# Patient Record
Sex: FEMALE | Race: WHITE | Employment: PART TIME | URBAN - METROPOLITAN AREA
[De-identification: names, ages, dates, MRNs, and addresses within clinical notes are randomized per-mention and may not be internally consistent; named-entity substitution may affect disease eponyms.]

---

## 2019-01-25 ENCOUNTER — OFFICE VISIT (OUTPATIENT)
Dept: HEMATOLOGY | Age: 69
End: 2019-01-25

## 2019-01-25 VITALS
HEART RATE: 71 BPM | WEIGHT: 180 LBS | DIASTOLIC BLOOD PRESSURE: 71 MMHG | HEIGHT: 64 IN | SYSTOLIC BLOOD PRESSURE: 164 MMHG | BODY MASS INDEX: 30.73 KG/M2 | TEMPERATURE: 99.5 F | OXYGEN SATURATION: 99 %

## 2019-01-25 DIAGNOSIS — K75.81 NASH (NONALCOHOLIC STEATOHEPATITIS): Primary | ICD-10-CM

## 2019-01-25 PROBLEM — E11.9 DIABETES MELLITUS, TYPE 2 (HCC): Status: ACTIVE | Noted: 2019-01-25

## 2019-01-25 PROBLEM — Z90.49 HISTORY OF CHOLECYSTECTOMY: Status: ACTIVE | Noted: 2019-01-25

## 2019-01-25 PROBLEM — Z90.710 S/P TAH (TOTAL ABDOMINAL HYSTERECTOMY): Status: ACTIVE | Noted: 2019-01-25

## 2019-01-25 PROBLEM — N20.0 KIDNEY STONES: Status: ACTIVE | Noted: 2019-01-25

## 2019-01-25 PROBLEM — E03.9 HYPOTHYROIDISM: Status: ACTIVE | Noted: 2019-01-25

## 2019-01-25 PROBLEM — I10 HYPERTENSION: Status: ACTIVE | Noted: 2019-01-25

## 2019-01-25 PROBLEM — Z98.890 H/O CARPAL TUNNEL REPAIR: Status: ACTIVE | Noted: 2019-01-25

## 2019-01-25 RX ORDER — CEPHALEXIN 500 MG/1
500 CAPSULE ORAL 4 TIMES DAILY
COMMUNITY
End: 2022-10-13

## 2019-01-25 RX ORDER — BISMUTH SUBSALICYLATE 262 MG
1 TABLET,CHEWABLE ORAL DAILY
COMMUNITY
End: 2022-10-13

## 2019-01-25 NOTE — PROGRESS NOTES
[address]40 [facility], [place], [address], Cite [facility], [place]       Laura Molina, JESSICA Perez, [facility]   Sushma Byrnes, RANDALL Pa [facility] 136    at [address]    [address] [place] [address], [address] [place] [address], Denis  22.    [phone]    FAX: [address]    [address], [address], [address]    [phone]    FAX: [phone]       Patient Care Team:  Kareem Ng MD as PCP - General (Family Practice)      Problem List  Date Reviewed: 1/25/2019          Codes Class Noted    KELSEY (nonalcoholic steatohepatitis) ICD-10-CM: K75.81  ICD-9-CM: 571.8  1/25/2019        Diabetes mellitus, type 2 ([facility] ICD-10-CM: E11.9  ICD-9-CM: 250.00  1/25/2019        Hypertension ICD-10-CM: I10  ICD-9-CM: 401.9  1/25/2019        Hypothyroidism ICD-10-CM: E03.9  ICD-9-CM: 244.9  1/25/2019        H/O carpal tunnel repair ICD-10-CM: Z98.890  ICD-9-CM: V15.29  1/25/2019        S/P CARIDAD (total abdominal hysterectomy) ICD-10-CM: Z90.710  ICD-9-CM: V88.01  1/25/2019        History of cholecystectomy ICD-10-CM: Z90.49  ICD-9-CM: V45.79  1/25/2019        Kidney stones ICD-10-CM: N20.0  ICD-9-CM: 592.0  1/25/2019        H/O total knee replacement ICD-10-CM: Z96.659  ICD-9-CM: V43.65  8/26/2013                The clinicians listed above have asked me to see Faisal Munson in consultation regarding fatty liver disease and its management. All medical records sent by the referring physicians were reviewed including imaging studies and pathology. The patient is a 79 y.o.  female who was found to have KELSEY on liver biopsy in 10/2018. 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     The most recent imaging of the liver was MRI performed in 10/2018. Results suggest fatty liver disease. A liver biopsy was performed in 10/2018. This demonstrated KELSEY with stage 2-3 fibrosis. The patient has no symptoms which can be attributed to the liver disorder. The patient has not experienced fatigue, pain in the right side over the liver,     The patient completes all daily activities without any functional limitations. ASSESSMENT AND PLAN:  KELSEY  The diagnosis is based upon liver biopsy,   A liver biopsy performed in 10/2018 shows stage 2-3 fibrosis. If the patient looses 20% of current body weight, which is 36 pounds, down to a weight of of 140 pounds, all steatosis will have resolved. Liver transaminases are elevated. ALP is normal.  Liver function is normal.  The platelet count is normal.      Will perform additional serologic tests to screen for other causes of chronic liver disease. There is currently no FDA approved medical treatment for fatty liver, NALFD or KELSEY. The patient was counseled regarding diet and exercise to achieve weight loss. The best diet for patients with fatty liver is one very low in carbohydrates and enriched with protein such as an [employer] program.      The patient was told to consume any food products and drinks containing fructose as this enhances hepatic fat synthesis. There is no medication or vitamin supplements that we advocate for KELSEY. Using glitazones in patients without diabetes mellitus has been shown to reduce fat content in the liver but has no effect on fibrosis and is associated with weight gain. Vitamin E has also been used but the data is not very good and most experts no longer advocate this. The only medical treatments for KELSEY are though clinical trials. The patient would like to participate in a clinical trial.    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      This single mutation can cause an elevation in ferritin but it does not lead to iron overload in the liver or other organs. A liver biopsy does not demonstrate evidence of iron overload. Screening for Hepatocellular Carcinoma  HCC screening is not necessary if the patient has no evidence of cirrhosis. Treatment of other medical problems in patients with chronic liver disease  There are no contraindications for the patient to take most medications that are necessary for treatment of other medical issues. The patient can take the following medications as these will not impact the patient's current liver disease. Any medications utilized for treatment of DM  Statins to treat hypercholesterolemia    The patient does not comsume alcohol on a daily basis. Normal doses of acetaminophen, as recommended on the label of the bottle, are not hepatotoxic except in the setting of daily alcohol use, even in patients with cirrhosis and can be utilized for pain. Counseling for alcohol in patients with chronic liver disease  The patient was counseled regarding alcohol consumption and the effect of alcohol on chronic liver disease. The patient does not consume any significant amount of alcohol. Vaccinations   The need for vaccination against viral hepatitis A and B will be assessed with serologic and instituted as appropriate. Routine vaccinations against other bacterial and viral agents can be performed as indicated. Annual flu vaccination should be administered if indicated.       ALLERGIES  Allergies   Allergen Reactions    Augmentin [Amoxicillin-Pot Clavulanate] Other (comments)     Tachycardia    Codeine Other (comments)     Itch/rash    Morphine Itching    Niaspan [Niacin] Rash    Pcn [Penicillins] Rash and Other (comments)     Nose Bleed    Sulfa Dyne Unknown (comments)     Can't remember reaction    Tetracycline Rash     Facial Rash       MEDICATIONS  Current Outpatient Medications   Medication Sig    cephALEXin (KEFLEX) 500 mg capsule Take 500 mg by mouth four (4) times daily.  multivitamin (ONE A DAY) tablet Take 1 Tab by mouth daily.  levothyroxine (SYNTHROID) 100 mcg tablet Take 88 mcg by mouth Daily (before breakfast).  losartan (COZAAR) 50 mg tablet Take 100 mg by mouth daily. No current facility-administered medications for this visit. SYSTEM REVIEW NOT RELATED TO LIVER DISEASE OR REVIEWED ABOVE:  Constitution systems: Negative for fever, chills, weight gain, weight loss. Eyes: Negative for visual changes. ENT: Negative for sore throat, painful swallowing. Respiratory: Negative for cough, hemoptysis, SOB. Cardiology: Negative for chest pain, palpitations. GI:  Negative for constipation or diarrhea. : Negative for urinary frequency, dysuria, hematuria, nocturia. Skin: Negative for rash. Hematology: Negative for easy bruising, blood clots. Musculo-skelatal: Negative for back pain, muscle pain, weakness. Neurologic: Negative for headaches, dizziness, vertigo, memory problems not related to HE. Psychology: Negative for anxiety, depression. FAMILY HISTORY:  The father  of MI. The mother  of lung cancer. There is no family history of liver disease. SOCIAL HISTORY:  The patient is . The patient has 2 children, and 2 grandchildren. The patient has never used tobacco products. The patient has never consumed significant amounts of alcohol. The patient currently works part time as . PHYSICAL EXAMINATION:  VS: per nursing note  General: No acute distress. Eyes: Sclera anicteric. ENT: No oral lesions. Thyroid normal.  Nodes: No adenopathy. Skin: No spider angiomata. No jaundice. No palmar erythema. Respiratory: Lungs clear to auscultation. Cardiovascular: Regular heart rate. No murmurs. No JVD. Abdomen: Soft non-tender, liver size normal to percussion/palpation. Spleen not palpable. No obvious ascites. Extremities: No edema.   No muscle wasting. No gross arthritic changes. Neurologic: Alert and oriented. Cranial nerves grossly intact. No asterixis. LABORATORY STUDIES:  From 9/2018  AST/ALT/ALP/T Bili/ALB:  96/107/70/0.7/3.3  WBC/HB/PLT/INR:  8.9/14.7/351  BUN/CREAT:  10/0.79    SEROLOGIES:  10/2018. Ferritin 460, HFE genetic testing sinlge mutation C282Y, HANNAH negative, ASMA negative,     LIVER HISTOLOGY:  10/2018. Liver biopsy from [address] [place] [address].  KELSEY with stage 2-3 fibrosis. ENDOSCOPIC PROCEDURES:  9/2018. EGD by Dr Rik Encinas. Normal.    RADIOLOGY:  10/2018. MRI of the liver. Changes consistent with fatty liver. No liver mass lesions. Normal spleen. No ascites. OTHER TESTING:  Not available or performed    FOLLOW-UP:  All of the issues listed above in the Assessment and Plan were discussed with the patient. All questions were answered. The patient expressed a clear understanding of the above. [address] [place] [address] in 4 weeks for screening and enrollment into a clinical trial for treatment of KELSEY.       Aura Romberg, MD [facility]  [address] [place] [address], 2000 [facility] 22.  [phone]  [address] [place] [address]

## 2019-01-25 NOTE — PROGRESS NOTES
Chief Complaint   Patient presents with    New Patient     KELSEY     Visit Vitals  /71 (BP 1 Location: Left arm, BP Patient Position: Sitting)   Pulse 71   Temp 99.5 °F (37.5 °C) (Tympanic)   Ht 5' 4\" (1.626 m)   Wt 180 lb (81.6 kg)   SpO2 99%   BMI 30.90 kg/m²     PHQ over the last two weeks 1/25/2019   Little interest or pleasure in doing things Not at all   Feeling down, depressed, irritable, or hopeless Not at all   Total Score PHQ 2 0     Abuse Screening Questionnaire 1/25/2019   Do you ever feel afraid of your partner? N   Are you in a relationship with someone who physically or mentally threatens you? N   Is it safe for you to go home?  Genesis Hair

## 2019-01-26 PROBLEM — G47.30 SLEEP APNEA: Status: ACTIVE | Noted: 2019-01-26

## 2019-05-29 ENCOUNTER — OFFICE VISIT (OUTPATIENT)
Dept: HEMATOLOGY | Age: 69
End: 2019-05-29

## 2019-05-29 VITALS
TEMPERATURE: 98.6 F | HEIGHT: 64 IN | SYSTOLIC BLOOD PRESSURE: 140 MMHG | WEIGHT: 172 LBS | OXYGEN SATURATION: 98 % | HEART RATE: 60 BPM | BODY MASS INDEX: 29.37 KG/M2 | DIASTOLIC BLOOD PRESSURE: 57 MMHG

## 2019-05-29 DIAGNOSIS — E11.9 TYPE 2 DIABETES MELLITUS WITHOUT COMPLICATION, UNSPECIFIED WHETHER LONG TERM INSULIN USE (HCC): ICD-10-CM

## 2019-05-29 DIAGNOSIS — K75.81 NASH (NONALCOHOLIC STEATOHEPATITIS): Primary | ICD-10-CM

## 2019-05-29 NOTE — PROGRESS NOTES
[address] [place] [address], MD, Ashley Shannon, Jimena Storm [place], [place]       Glenn Domingo, JESSICA Arzola, WINNIE-RANDALL Smith Res, NP [facility] 136    at [facility]    217 [facility], [address] [place] [address], [facility] 22.    [phone]    FAX: [address] [place] [address]    at [address], [address]    [phone]    FAX: [phone]       Patient Care Team:  Kyle Medina MD as PCP - [facility])  Porsha Cunningham MD (Gastroenterology)  Eleno Jama MD (General Surgery)      Problem List  Date Reviewed: 1/26/2019          Codes Class Noted    Sleep apnea ICD-10-CM: G47.30  ICD-9-CM: 780.57  1/26/2019        KELSEY (nonalcoholic steatohepatitis) ICD-10-CM: K75.81  ICD-9-CM: 571.8  1/25/2019        Diabetes mellitus, type 2 ([facility] ICD-10-CM: E11.9  ICD-9-CM: 250.00  1/25/2019        Hypertension ICD-10-CM: I10  ICD-9-CM: 401.9  1/25/2019        Hypothyroidism ICD-10-CM: E03.9  ICD-9-CM: 244.9  1/25/2019        H/O carpal tunnel repair ICD-10-CM: Z98.890  ICD-9-CM: V15.29  1/25/2019        S/P CARIDAD (total abdominal hysterectomy) ICD-10-CM: Z90.710  ICD-9-CM: V88.01  1/25/2019        History of cholecystectomy ICD-10-CM: Z90.49  ICD-9-CM: V45.79  1/25/2019        Kidney stones ICD-10-CM: N20.0  ICD-9-CM: 592.0  1/25/2019        H/O total knee replacement ICD-10-CM: Z96.659  ICD-9-CM: V43.65  8/26/2013            [facility] non-alcoholic steatohepatitis (KELSEY). The active problem list, all pertinent past medical history, medications,radiologic findings and laboratory findings related to the liver disorder were reviewed with the patient. The patient is a 81 y.o.  female who was found to have KELSEY on liver biopsy in 10/2018. This was following noted elevation of liver enzymes. Serologic evaluation for markers of chronic liver disease was positive for HFE single mutation, otherwise negative. The most recent imaging of the liver was MRI performed in 10/2018. Results suggest fatty liver disease. A liver biopsy was performed in 10/2018. This demonstrated KELSEY with stage 2-3 fibrosis. The patient has no symptoms which can be attributed to the liver disorder. The patient has not experienced fatigue, pain in the right side over the liver. The patient completes all daily activities without any functional limitations. ASSESSMENT AND PLAN:  KELSEY  The diagnosis is based upon liver biopsy. A liver biopsy performed in 10/2018 shows stage 2-3 fibrosis. If the patient looses 20% of current body weight, which is 36 pounds, down to a weight of of 140 pounds, all steatosis will have resolved. Liver transaminases are elevated. ALP is normal.  Liver function is normal.  The platelet count is normal.  These values will be repeated to day to see the impact of her weight losses. Will perform additional serologic tests to screen for other causes of chronic liver disease. Will ensure at next office visit that viral hepatitis testing has returned negative. There is currently no FDA approved medical treatment for fatty liver, NALFD or KELSEY. The patient was counseled regarding diet and exercise to achieve weight loss. The best diet for patients with fatty liver is one very low in carbohydrates and enriched with protein. She has been following this diet and has lost ~30# in the past 6 months. She has a goal of ~160#. I spent a significant amount of her office visit reviewing with her the natural history and progression of steatosis and the results of her past work-up. I think she has a better understanding of her results.      There is no medication or vitamin supplements that we advocate for KELSEY. Using glitazones in patients without diabetes mellitus has been shown to reduce fat content in the liver but has no effect on fibrosis and is associated with weight gain. Vitamin E has also been used but the data is not very good and most experts no longer advocate this. The only medical treatments for KELSEY are though clinical trials. The patient would like to participate in a clinical trial, will consider her for inclusion. 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 This single mutation can cause an elevation in ferritin but it does not lead to iron overload in the liver or other organs. A liver biopsy does not demonstrate evidence of iron overload. Treatment of other medical problems in patients with chronic liver disease  There are no contraindications for the patient to take most medications that are necessary for treatment of other medical issues. The patient can take the following medications as these will not impact the patient's current liver disease. Any medications utilized for treatment of DM  Statins to treat hypercholesterolemia    The patient does not comsume alcohol on a daily basis. Normal doses of acetaminophen, as recommended on the label of the bottle, are not hepatotoxic except in the setting of daily alcohol use, even in patients with cirrhosis and can be utilized for pain. Counseling for alcohol in patients with chronic liver disease  The patient was counseled regarding alcohol consumption and the effect of alcohol on chronic liver disease. The patient does not consume any significant amount of alcohol. Vaccinations   The need for vaccination against viral hepatitis A and B will be assessed with serologic and instituted as appropriate. Routine vaccinations against other bacterial and viral agents can be performed as indicated.   Annual flu vaccination should be administered if indicated. ALLERGIES  Allergies   Allergen Reactions    Augmentin [Amoxicillin-Pot Clavulanate] Other (comments)     Tachycardia    Codeine Other (comments)     Itch/rash    Morphine Itching    Niaspan [Niacin] Rash    Pcn [Penicillins] Rash and Other (comments)     Nose Bleed    Sulfa Dyne Unknown (comments)     Can't remember reaction    Tetracycline Rash     Facial Rash       MEDICATIONS  Current Outpatient Medications   Medication Sig    multivitamin (ONE A DAY) tablet Take 1 Tab by mouth daily.  levothyroxine (SYNTHROID) 88 mcg tablet Take 88 mcg by mouth Daily (before breakfast). Indications: hypothyroidism    losartan (COZAAR) 100 mg tablet Take 100 mg by mouth daily. Indications: high blood pressure    cephALEXin (KEFLEX) 500 mg capsule Take 500 mg by mouth four (4) times daily. No current facility-administered medications for this visit. SYSTEM REVIEW NOT RELATED TO LIVER DISEASE OR REVIEWED ABOVE:  Constitution systems: Negative for fever, chills, weight gain, weight loss. Eyes: Negative for visual changes. ENT: Negative for sore throat, painful swallowing. Respiratory: Negative for cough, hemoptysis, SOB. Cardiology: Negative for chest pain, palpitations. GI:  Negative for constipation or diarrhea. : Negative for urinary frequency, dysuria, hematuria, nocturia. Skin: Negative for rash. Hematology: Negative for easy bruising, blood clots. Musculo-skeletal: Negative for back pain, muscle pain, weakness. Neurologic: Negative for headaches, dizziness, vertigo, memory problems not related to HE. Psychology: Negative for anxiety, depression. FAMILY HISTORY:  The father  of MI. The mother  of lung cancer. There is no family history of liver disease. SOCIAL HISTORY:  The patient is . The patient has 2 children, and 2 grandchildren. The patient has never used tobacco products.     The patient has never consumed significant amounts of alcohol. The patient currently works part time as . PHYSICAL EXAMINATION:  VS: per nursing note  General: No acute distress. Eyes: Sclera anicteric. ENT: No oral lesions. Thyroid normal.  Nodes: No adenopathy. Skin: No spider angiomata. No jaundice. No palmar erythema. Respiratory: Lungs clear to auscultation. Cardiovascular: Regular heart rate. No murmurs. No JVD. Abdomen: Soft non-tender, liver size normal to percussion/palpation. Spleen not palpable. No obvious ascites. Extremities: No edema. No muscle wasting. No gross arthritic changes. Neurologic: Alert and oriented. Cranial nerves grossly intact. No asterixis. LABORATORY STUDIES:  From 9/2018  AST/ALT/ALP/T Bili/ALB:  96/107/70/0.7/3.3  WBC/HB/PLT/INR:  8.9/14.7/351  BUN/CREAT:  10/0.79    Additional lab values drawn at today's office visit are pending at the time of documentation. SEROLOGIES:  10/2018. Ferritin 460, HFE genetic testing sinlge mutation C282Y, HANNAH negative, ASMA negative. LIVER HISTOLOGY:  10/2018. Liver biopsy from [address] [place] [address].  KELSEY with stage 2-3 fibrosis. ENDOSCOPIC PROCEDURES:  9/2018. EGD by Dr Pauline Rodríguez. Normal.    RADIOLOGY:  10/2018. MRI of the liver. Changes consistent with fatty liver. No liver mass lesions. Normal spleen. No ascites. OTHER TESTING:  Not available or performed    FOLLOW-UP:  All of the issues listed above in the Assessment and Plan were discussed with the patient. All questions were answered. The patient expressed a clear understanding of the above. [address] [facility] 87 in 4 months with fibroscan.        JESSICA Painter 13 of [address] [address] Luis Vazquez, 2000 [facility], [facility] 22.  [phone]  [address]

## 2019-05-29 NOTE — PROGRESS NOTES
1. Have you been to the ER, urgent care clinic since your last visit? Hospitalized since your last visit? No    2. Have you seen or consulted any other health care providers outside of the [address] [place] [address] since your last visit? Include any pap smears or colon screening. Yes Where: Yes, General Surgery for sweat gland infection     Chief Complaint   Patient presents with    Follow-up     Visit Vitals  /57   Pulse 60   Temp 98.6 °F (37 °C) (Tympanic)   Ht 5' 4\" (1.626 m)   Wt 172 lb (78 kg)   SpO2 98%   BMI 29.52 kg/m²     3 most recent PHQ Screens 5/29/2019   Little interest or pleasure in doing things Not at all   Feeling down, depressed, irritable, or hopeless Not at all   Total Score PHQ 2 0     Fall Risk Assessment, last 12 mths 5/29/2019   Able to walk? Yes   Fall in past 12 months? No     Learning Assessment 5/29/2019   PRIMARY LEARNER Patient   HIGHEST LEVEL OF EDUCATION - PRIMARY LEARNER  -   BARRIERS PRIMARY LEARNER NONE   CO-LEARNER CAREGIVER No   PRIMARY LANGUAGE ENGLISH   LEARNER PREFERENCE PRIMARY LISTENING   ANSWERED BY patient   RELATIONSHIP SELF     Abuse Screening Questionnaire 5/29/2019   Do you ever feel afraid of your partner? N   Are you in a relationship with someone who physically or mentally threatens you? N   Is it safe for you to go home?  Yessi Madrid

## 2019-05-30 LAB
ALBUMIN SERPL-MCNC: 4.8 G/DL (ref 3.6–4.8)
ALP SERPL-CCNC: 78 IU/L (ref 39–117)
ALT SERPL-CCNC: 19 IU/L (ref 0–32)
AST SERPL-CCNC: 23 IU/L (ref 0–40)
BILIRUB DIRECT SERPL-MCNC: 0.18 MG/DL (ref 0–0.4)
BILIRUB SERPL-MCNC: 0.6 MG/DL (ref 0–1.2)
BUN SERPL-MCNC: 12 MG/DL (ref 8–27)
BUN/CREAT SERPL: 16 (ref 12–28)
CALCIUM SERPL-MCNC: 10.2 MG/DL (ref 8.7–10.3)
CHLORIDE SERPL-SCNC: 101 MMOL/L (ref 96–106)
CHOLEST SERPL-MCNC: 172 MG/DL (ref 100–199)
CO2 SERPL-SCNC: 22 MMOL/L (ref 20–29)
CREAT SERPL-MCNC: 0.73 MG/DL (ref 0.57–1)
ERYTHROCYTE [DISTWIDTH] IN BLOOD BY AUTOMATED COUNT: 14.9 % (ref 12.3–15.4)
EST. AVERAGE GLUCOSE BLD GHB EST-MCNC: 108 MG/DL
GLUCOSE SERPL-MCNC: 95 MG/DL (ref 65–99)
HBA1C MFR BLD: 5.4 % (ref 4.8–5.6)
HCT VFR BLD AUTO: 46.3 % (ref 34–46.6)
HDLC SERPL-MCNC: 52 MG/DL
HGB BLD-MCNC: 15.5 G/DL (ref 11.1–15.9)
LDLC SERPL CALC-MCNC: 91 MG/DL (ref 0–99)
MCH RBC QN AUTO: 29.2 PG (ref 26.6–33)
MCHC RBC AUTO-ENTMCNC: 33.5 G/DL (ref 31.5–35.7)
MCV RBC AUTO: 87 FL (ref 79–97)
PLATELET # BLD AUTO: 345 X10E3/UL (ref 150–450)
POTASSIUM SERPL-SCNC: 5 MMOL/L (ref 3.5–5.2)
RBC # BLD AUTO: 5.3 X10E6/UL (ref 3.77–5.28)
SODIUM SERPL-SCNC: 141 MMOL/L (ref 134–144)
TRIGL SERPL-MCNC: 146 MG/DL (ref 0–149)
VLDLC SERPL CALC-MCNC: 29 MG/DL (ref 5–40)
WBC # BLD AUTO: 7.8 X10E3/UL (ref 3.4–10.8)

## 2019-05-30 NOTE — PROGRESS NOTES
Pt notified of normalization in liver enzymes with her weight loss of 30+#. All indices look great, follow-up in 4 months with FS as scheduled.

## 2019-09-26 ENCOUNTER — OFFICE VISIT (OUTPATIENT)
Dept: HEMATOLOGY | Age: 69
End: 2019-09-26

## 2019-09-26 VITALS
DIASTOLIC BLOOD PRESSURE: 68 MMHG | HEART RATE: 68 BPM | WEIGHT: 174 LBS | SYSTOLIC BLOOD PRESSURE: 134 MMHG | OXYGEN SATURATION: 98 % | BODY MASS INDEX: 29.87 KG/M2

## 2019-09-26 DIAGNOSIS — K75.81 NASH (NONALCOHOLIC STEATOHEPATITIS): Primary | ICD-10-CM

## 2019-09-26 NOTE — PROGRESS NOTES
[facility], MD, [address], Cite Wilson Fraire, Rexine Hodgkin, MD Lorena Hy, PA-C    uY Syed, ACNP-BC     Charlene SERRATO Erika, [facility]   Israel Solis, FNP-C    Carlos Enrique Guo, [facility]       Diann Liao Lai De [address] 136    at [address], [address] Denis Vazquez  22.    [phone]    FAX: [address] [place] [address] Avenue    [address], [address], [address]    [phone]    FAX: [phone]       Patient Care Team:  Pat Dias MD as PCP - General (Family Practice)  Braulio Snider MD (Gastroenterology)  Jessy Chen MD (General Surgery)      Problem List  Date Reviewed: 5/29/2019          Codes Class Noted    Sleep apnea ICD-10-CM: G47.30  ICD-9-CM: 780.57  1/26/2019        KELSEY (nonalcoholic steatohepatitis) ICD-10-CM: K75.81  ICD-9-CM: 571.8  1/25/2019        Diabetes mellitus, type 2 ([address] ICD-10-CM: E11.9  ICD-9-CM: 250.00  1/25/2019        Hypertension ICD-10-CM: I10  ICD-9-CM: 401.9  1/25/2019        Hypothyroidism ICD-10-CM: E03.9  ICD-9-CM: 244.9  1/25/2019        H/O carpal tunnel repair ICD-10-CM: Z98.890  ICD-9-CM: V15.29  1/25/2019        S/P CARIDAD (total abdominal hysterectomy) ICD-10-CM: Z90.710  ICD-9-CM: V88.01  1/25/2019        History of cholecystectomy ICD-10-CM: Z90.49  ICD-9-CM: V45.79  1/25/2019        Kidney stones ICD-10-CM: N20.0  ICD-9-CM: 592.0  1/25/2019        H/O total knee replacement ICD-10-CM: Z96.659  ICD-9-CM: V43.65  8/26/2013            Dustin Saleh returns to the [address] for management of non-alcoholic steatohepatitis (KELSEY).  The active problem list, all pertinent past medical history, medications,radiologic findings and laboratory findings related to the liver disorder were reviewed with the patient. The patient is a 81 y.o.  female who was found to have KELSEY on liver biopsy in 10/2018. This was following noted elevation of liver enzymes. Serologic evaluation for markers of chronic liver disease was positive for HFE single mutation, otherwise negative. The most recent imaging of the liver was MRI performed in 10/2018. Results suggest fatty liver disease. A liver biopsy was performed in 10/2018. This demonstrated KELSEY with stage 2-3 fibrosis. Over the course of the last 8 months, she has maintained a ~30# weight loss. She is somewhat frustrated that her weight has plateaued lately, but is still working at this. Liver enzymes have normalized on last assessment with weight loss. We plan to perform Fibroscan assessment today     The patient has no symptoms which can be attributed to the liver disorder. The patient has not experienced fatigue, pain in the right side over the liver. The patient completes all daily activities without any functional limitations. ASSESSMENT AND PLAN:  KELSEY  The diagnosis is based upon liver biopsy. A liver biopsy performed in 10/2018 shows stage 2-3 fibrosis. She has lost a significant amount of weight and has normalized the liver enzymes. Her current Fibroscan in the office would suggest that there may have been down-staging of fibrosis as well. I have encouraged her continued goals and she relates that she is feeling better overall. She has a goal of ~160#. I have reviewed in detail with her the results of the study today and will repeat liver enzymes and lipid panel for monitoring. Liver transaminases are elevated. ALP is normal.  Liver function is normal.  The platelet count is normal.  These values will be repeated to day to see the impact of her weight losses. There is currently no FDA approved medical treatment for fatty liver, NALFD or KELSEY.     There is no medication or vitamin supplements that we advocate for KELSEY. Using glitazones in patients without diabetes mellitus has been shown to reduce fat content in the liver but has no effect on fibrosis and is associated with weight gain. Vitamin E has also been used but the data is not very good and most experts no longer advocate this. The only medical treatments for KELSEY are though clinical trials. The patient would like to participate in a clinical trial, will consider her for inclusion in future. She would not presently meet criteria for any of our enrolling studies. 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 This single mutation can cause an elevation in ferritin but it does not lead to iron overload in the liver or other organs. A liver biopsy does not demonstrate evidence of iron overload. Treatment of other medical problems in patients with chronic liver disease  There are no contraindications for the patient to take most medications that are necessary for treatment of other medical issues. The patient can take the following medications as these will not impact the patient's current liver disease. Any medications utilized for treatment of DM  Statins to treat hypercholesterolemia    The patient does not comsume alcohol on a daily basis. Normal doses of acetaminophen, as recommended on the label of the bottle, are not hepatotoxic except in the setting of daily alcohol use, even in patients with cirrhosis and can be utilized for pain. Counseling for alcohol in patients with chronic liver disease  The patient was counseled regarding alcohol consumption and the effect of alcohol on chronic liver disease. The patient does not consume any significant amount of alcohol. Vaccinations   The need for vaccination against viral hepatitis A and B will be assessed with serologic and instituted as appropriate.   Routine vaccinations against other bacterial and viral agents can be performed as indicated. Annual flu vaccination should be administered if indicated. ALLERGIES  Allergies   Allergen Reactions    Augmentin [Amoxicillin-Pot Clavulanate] Other (comments)     Tachycardia    Codeine Other (comments)     Itch/rash    Morphine Itching    Niaspan [Niacin] Rash    Pcn [Penicillins] Rash and Other (comments)     Nose Bleed    Sulfa Dyne Unknown (comments)     Can't remember reaction    Tetracycline Rash     Facial Rash       MEDICATIONS  Current Outpatient Medications   Medication Sig    cephALEXin (KEFLEX) 500 mg capsule Take 500 mg by mouth four (4) times daily.  multivitamin (ONE A DAY) tablet Take 1 Tab by mouth daily.  levothyroxine (SYNTHROID) 88 mcg tablet Take 88 mcg by mouth Daily (before breakfast). Indications: hypothyroidism    losartan (COZAAR) 100 mg tablet Take 100 mg by mouth daily. Indications: high blood pressure     No current facility-administered medications for this visit. SYSTEM REVIEW NOT RELATED TO LIVER DISEASE OR REVIEWED ABOVE:  Constitution systems: Negative for fever, chills. Weight stable since last office visit. Eyes: Negative for visual changes. ENT: Negative for sore throat, painful swallowing. Respiratory: Negative for cough, hemoptysis, SOB. Cardiology: Negative for chest pain, palpitations. GI:  Negative for constipation or diarrhea. : Negative for urinary frequency, dysuria, hematuria, nocturia. Skin: Negative for rash. Hematology: Negative for easy bruising, blood clots. Musculo-skeletal: Negative for back pain, muscle pain, weakness. Neurologic: Negative for headaches, dizziness, vertigo, memory problems not related to HE. Psychology: Negative for anxiety, depression. FAMILY HISTORY:  The father  of MI. The mother  of lung cancer. There is no family history of liver disease. SOCIAL HISTORY:  The patient is . The patient has 2 children, and 2 grandchildren.     The patient has never used tobacco products. The patient has never consumed significant amounts of alcohol. The patient currently works part time as . PHYSICAL EXAMINATION:  VS: per nursing note  General: No acute distress. Eyes: Sclera anicteric. ENT: No oral lesions. Thyroid normal.  Nodes: No adenopathy. Skin: No spider angiomata. No jaundice. No palmar erythema. Respiratory: Lungs clear to auscultation. Cardiovascular: Regular heart rate. No murmurs. No JVD. Abdomen: Soft non-tender, liver size normal to percussion/palpation. Spleen not palpable. No obvious ascites. Extremities: No edema. No muscle wasting. No gross arthritic changes. Neurologic: Alert and oriented. Cranial nerves grossly intact. No asterixis. LABORATORY STUDIES:  Liver [place] of [address] [place] [address] 5/29/2019   WBC 3.4 - 10.8 x10E3/uL 7.8   HGB 11.1 - 15.9 g/dL 15.5    - 450 x10E3/uL 345   AST 0 - 40 IU/L 23   ALT 0 - 32 IU/L 19   Alk Phos 39 - 117 IU/L 78   Bili, Total 0.0 - 1.2 mg/dL 0.6   Bili, Direct 0.00 - 0.40 mg/dL 0.18   Albumin 3.6 - 4.8 g/dL 4.8   BUN 8 - 27 mg/dL 12   Creat 0.57 - 1.00 mg/dL 0.73   Na 134 - 144 mmol/L 141   K 3.5 - 5.2 mmol/L 5.0   Cl 96 - 106 mmol/L 101   CO2 20 - 29 mmol/L 22   Glucose 65 - 99 mg/dL 95     From 9/2018  AST/ALT/ALP/T Bili/ALB:  96/107/70/0.7/3.3  WBC/HB/PLT/INR:  8.9/14.7/351  BUN/CREAT:  10/0.79    Additional lab values drawn at today's office visit are pending at the time of documentation. SEROLOGIES:  10/2018. Ferritin 460, HFE genetic testing sinlge mutation C282Y, HANNAH negative, ASMA negative. LIVER HISTOLOGY:  10/2018. Liver biopsy from [address] [place] [address].  KELSEY with stage 2-3 fibrosis. 9/2019. FibroScan performed at [address]. EkPa was 7.2. Suggested fibrosis level is F1-2. CAP score is 380, this is consistent with steatosis. ENDOSCOPIC PROCEDURES:  9/2018. EGD by Dr Rosa Maria Milner. Normal.    RADIOLOGY:  10/2018.   MRI of the liver.  Changes consistent with fatty liver. No liver mass lesions. Normal spleen. No ascites. OTHER TESTING:  Not available or performed    FOLLOW-UP:  All of the issues listed above in the Assessment and Plan were discussed with the patient. All questions were answered. The patient expressed a clear understanding of the above. [facility] [place] [address] in 6 months for monitoring of weight loss and liver functions.      JESSICA Loredo [facility] [address] [address] [address] Luis Vazquez, [address] [place] [address] 22.  [phone]  [address] [place] [address]

## 2019-09-26 NOTE — PROGRESS NOTES
1. Have you been to the ER, urgent care clinic since your last visit? Hospitalized since your last visit? No    2. Have you seen or consulted any other health care providers outside of the [address] [place] [address] since your last visit? Include any pap smears or colon screening. No   Chief Complaint   Patient presents with    Follow-up     fibrscan      Visit Vitals  /68 (BP 1 Location: Left arm, BP Patient Position: Sitting)   Pulse 68   Wt 174 lb (78.9 kg)   SpO2 98%   BMI 29.87 kg/m²     3 most recent PHQ Screens 9/26/2019   Little interest or pleasure in doing things Not at all   Feeling down, depressed, irritable, or hopeless Not at all   Total Score PHQ 2 0     Learning Assessment 9/26/2019   PRIMARY LEARNER Patient   HIGHEST LEVEL OF EDUCATION - PRIMARY LEARNER  -   BARRIERS PRIMARY LEARNER NONE   CO-LEARNER CAREGIVER No   PRIMARY LANGUAGE ENGLISH   LEARNER PREFERENCE PRIMARY LISTENING   ANSWERED BY patient   RELATIONSHIP SELF     Abuse Screening Questionnaire 9/26/2019   Do you ever feel afraid of your partner? N   Are you in a relationship with someone who physically or mentally threatens you? N   Is it safe for you to go home? Y     Fall Risk Assessment, last 12 mths 9/26/2019   Able to walk? Yes   Fall in past 12 months?  No

## 2019-09-27 LAB
ALBUMIN SERPL-MCNC: 4.7 G/DL (ref 3.6–4.8)
ALP SERPL-CCNC: 74 IU/L (ref 39–117)
ALT SERPL-CCNC: 19 IU/L (ref 0–32)
AST SERPL-CCNC: 18 IU/L (ref 0–40)
BILIRUB DIRECT SERPL-MCNC: 0.16 MG/DL (ref 0–0.4)
BILIRUB SERPL-MCNC: 0.6 MG/DL (ref 0–1.2)
CHOLEST SERPL-MCNC: 146 MG/DL (ref 100–199)
HDLC SERPL-MCNC: 42 MG/DL
LDLC SERPL CALC-MCNC: 70 MG/DL (ref 0–99)
TRIGL SERPL-MCNC: 169 MG/DL (ref 0–149)
VLDLC SERPL CALC-MCNC: 34 MG/DL (ref 5–40)

## 2020-07-31 ENCOUNTER — TELEPHONE (OUTPATIENT)
Dept: HEMATOLOGY | Age: 70
End: 2020-07-31

## 2020-07-31 NOTE — TELEPHONE ENCOUNTER
Called pt, l/m re cancellation and reschedule of appointment due to provider being out of office on 8. 3.20.

## 2020-08-07 DIAGNOSIS — K75.81 NASH (NONALCOHOLIC STEATOHEPATITIS): Primary | ICD-10-CM

## 2020-08-18 LAB
ALBUMIN SERPL-MCNC: 4.6 G/DL (ref 3.8–4.8)
ALP SERPL-CCNC: 87 IU/L (ref 39–117)
ALT SERPL-CCNC: 21 IU/L (ref 0–32)
AST SERPL-CCNC: 21 IU/L (ref 0–40)
BILIRUB DIRECT SERPL-MCNC: 0.17 MG/DL (ref 0–0.4)
BILIRUB SERPL-MCNC: 0.6 MG/DL (ref 0–1.2)
BUN SERPL-MCNC: 10 MG/DL (ref 8–27)
BUN/CREAT SERPL: 13 (ref 12–28)
CALCIUM SERPL-MCNC: 9.8 MG/DL (ref 8.7–10.3)
CHLORIDE SERPL-SCNC: 103 MMOL/L (ref 96–106)
CO2 SERPL-SCNC: 25 MMOL/L (ref 20–29)
CREAT SERPL-MCNC: 0.8 MG/DL (ref 0.57–1)
ERYTHROCYTE [DISTWIDTH] IN BLOOD BY AUTOMATED COUNT: 13.2 % (ref 11.7–15.4)
GLUCOSE SERPL-MCNC: 111 MG/DL (ref 65–99)
HCT VFR BLD AUTO: 43.8 % (ref 34–46.6)
HGB BLD-MCNC: 14.9 G/DL (ref 11.1–15.9)
MCH RBC QN AUTO: 30.2 PG (ref 26.6–33)
MCHC RBC AUTO-ENTMCNC: 34 G/DL (ref 31.5–35.7)
MCV RBC AUTO: 89 FL (ref 79–97)
PLATELET # BLD AUTO: 377 X10E3/UL (ref 150–450)
POTASSIUM SERPL-SCNC: 5.7 MMOL/L (ref 3.5–5.2)
PROT SERPL-MCNC: 7 G/DL (ref 6–8.5)
RBC # BLD AUTO: 4.94 X10E6/UL (ref 3.77–5.28)
SODIUM SERPL-SCNC: 142 MMOL/L (ref 134–144)
WBC # BLD AUTO: 8.8 X10E3/UL (ref 3.4–10.8)

## 2020-08-21 NOTE — PROGRESS NOTES
Pt notified of stable enzymes and function. Known to have increased fibrosis on past exams, will plan return office visit in 6 months for repeat Fibroscan.

## 2021-06-22 ENCOUNTER — TELEPHONE (OUTPATIENT)
Dept: HEMATOLOGY | Age: 71
End: 2021-06-22

## 2021-06-22 NOTE — TELEPHONE ENCOUNTER
----- Message from Marcia Costa sent at 6/22/2021 11:59 AM EDT -----  Regarding: HEAVEN Wahl/Telephone  Contact: [phone]  General Message/Vendor Calls    Caller's first and last name:Pt      Reason for call:Pt would like a call back get scheduled for a fibroscan. Callback required yes/no and why:Yes, schedule fibroscan.        Best contact number(s):[phone]        Details to clarify the request:n/a      Marcia Costa

## 2021-08-10 ENCOUNTER — OFFICE VISIT (OUTPATIENT)
Dept: HEMATOLOGY | Age: 71
End: 2021-08-10
Payer: MEDICARE

## 2021-08-10 VITALS
WEIGHT: 186.6 LBS | RESPIRATION RATE: 16 BRPM | HEART RATE: 76 BPM | SYSTOLIC BLOOD PRESSURE: 125 MMHG | BODY MASS INDEX: 31.86 KG/M2 | OXYGEN SATURATION: 99 % | TEMPERATURE: 96.8 F | DIASTOLIC BLOOD PRESSURE: 65 MMHG | HEIGHT: 64 IN

## 2021-08-10 DIAGNOSIS — K75.81 NASH (NONALCOHOLIC STEATOHEPATITIS): Primary | ICD-10-CM

## 2021-08-10 PROCEDURE — G8754 DIAS BP LESS 90: HCPCS | Performed by: PHYSICIAN ASSISTANT

## 2021-08-10 PROCEDURE — 99214 OFFICE O/P EST MOD 30 MIN: CPT | Performed by: PHYSICIAN ASSISTANT

## 2021-08-10 PROCEDURE — G8536 NO DOC ELDER MAL SCRN: HCPCS | Performed by: PHYSICIAN ASSISTANT

## 2021-08-10 PROCEDURE — G8427 DOCREV CUR MEDS BY ELIG CLIN: HCPCS | Performed by: PHYSICIAN ASSISTANT

## 2021-08-10 PROCEDURE — G8432 DEP SCR NOT DOC, RNG: HCPCS | Performed by: PHYSICIAN ASSISTANT

## 2021-08-10 PROCEDURE — G8752 SYS BP LESS 140: HCPCS | Performed by: PHYSICIAN ASSISTANT

## 2021-08-10 PROCEDURE — 1101F PT FALLS ASSESS-DOCD LE1/YR: CPT | Performed by: PHYSICIAN ASSISTANT

## 2021-08-10 PROCEDURE — G0463 HOSPITAL OUTPT CLINIC VISIT: HCPCS | Performed by: PHYSICIAN ASSISTANT

## 2021-08-10 PROCEDURE — 91200 LIVER ELASTOGRAPHY: CPT | Performed by: PHYSICIAN ASSISTANT

## 2021-08-10 PROCEDURE — 3017F COLORECTAL CA SCREEN DOC REV: CPT | Performed by: PHYSICIAN ASSISTANT

## 2021-08-10 PROCEDURE — G8400 PT W/DXA NO RESULTS DOC: HCPCS | Performed by: PHYSICIAN ASSISTANT

## 2021-08-10 PROCEDURE — G8417 CALC BMI ABV UP PARAM F/U: HCPCS | Performed by: PHYSICIAN ASSISTANT

## 2021-08-10 PROCEDURE — 1090F PRES/ABSN URINE INCON ASSESS: CPT | Performed by: PHYSICIAN ASSISTANT

## 2021-08-10 RX ORDER — ICOSAPENT ETHYL 1000 MG/1
CAPSULE ORAL
COMMUNITY
Start: 2021-06-02 | End: 2022-10-13

## 2021-08-10 RX ORDER — AMLODIPINE BESYLATE 5 MG/1
5 TABLET ORAL DAILY
COMMUNITY
Start: 2021-05-30

## 2021-08-10 NOTE — PROGRESS NOTES
[address] [place] [address], MD, MD Makayla Chris Res, PA-C Oralee Boozer, [facility]-BC     April S Erika, [facility]   TAMIE Haywood-BERNARDO [facility]       Diann Deputado Lai De Ordonez 136    at [address], [address] [place] [address], [facility] 22.    [phone]    FAX: [address] [place] [address], [address]    [phone]    FAX: [phone]       Patient Care Team:  Bello Grover MD as PCP - General (Family Medicine)  Bello Grover MD as PCP - [address] [place] [address] Provider  Emil Garg MD (Gastroenterology)  Fern Gatica MD (General Surgery)      Problem List  Date Reviewed: 9/26/2019        Codes Class Noted    Sleep apnea ICD-10-CM: G47.30  ICD-9-CM: 780.57  1/26/2019        KELSEY (nonalcoholic steatohepatitis) ICD-10-CM: K75.81  ICD-9-CM: 571.8  1/25/2019        Di[facility] ICD-10-CM: E11.9  ICD-9-CM: 250.00  1/25/2019        Hypertension ICD-10-CM: I10  ICD-9-CM: 401.9  1/25/2019        Hypothyroidism ICD-10-CM: E03.9  ICD-9-CM: 244.9  1/25/2019        H/O carpal tunnel repair ICD-10-CM: Z98.890  ICD-9-CM: V15.29  1/25/2019        S/P CARIDAD (total abdominal hysterectomy) ICD-10-CM: Z90.710  ICD-9-CM: V88.01  1/25/2019        History of cholecystectomy ICD-10-CM: Z90.49  ICD-9-CM: V45.79  1/25/2019        Kidney stones ICD-10-CM: N20.0  ICD-9-CM: 592.0  1/25/2019        H/O total knee replacement ICD-10-CM: E15.556  ICD-9-CM: V43.65  8/26/2013            Ingrid Garcia See returns to the [facility] for management of non-alcoholic steatohepatitis (KELSEY).  The active problem list, all pertinent past medical history, medications, radiologic findings and laboratory findings related to the liver disorder were reviewed with the patient. This is her first presentation to this office in 2 years. The patient is a 73 y.o.  female who was found to have KELSEY, stage 2-3, on liver biopsy in 10/2018. This was following noted elevation of liver enzymes. Serologic evaluation for markers of chronic liver disease was positive for HFE single mutation, otherwise negative. She had MRI performed in 10/2018. Results suggest fatty liver disease. A liver biopsy was performed in 10/2018. This demonstrated KELSEY with stage 2-3 fibrosis. Since her last office visit of 2 years ago, she has had weight gain of ~12#. Prior to this, she had ~30# weight loss. She stopped keto diet due to her elevation in TGs in 3/2021 or so at the suggestion of her PCP. She was then given a trial of Vascepa, which caused abdominal bloating and nausea and she discontinued therapy after a 3 month trial ~5/2021. Liver enzymes have normalized on past assessments with associated weight loss. We plan to perform Fibroscan assessment today for assessment of progression. The patient has no symptoms which can be attributed to the liver disorder. The patient has not experienced fatigue, pain in the right side over the liver. The patient completes all daily activities without any functional limitations. She has had some RUQ pain and discomfort which she related to use of Vascepa. She has had CT scan in 6/2021 at [address] [place] [address] for assessment. She was found to have diverticulosis, no infection. ASSESSMENT AND PLAN:  KELSEY  The diagnosis is based upon liver biopsy. A liver biopsy performed in 10/2018 shows stage 2-3 fibrosis. She has lost a significant amount of weight in the past and had largely normalized the liver enzymes. Her current Fibroscan in the office would suggest that there may have been down-staging of fibrosis as well.  I have encouraged her continued goals and she relates that she is feeling better overall. She has a goal of ~160-165#. I have reviewed in detail with her the results of the study today and will repeat liver enzymes and lipid panel for monitoring. I have discussed in detail with her that if she is found to have elevation in TG, there is no limitation on the basis of her liver disease with regard to medication selection. She has requested repeat assessment with labs today. Liver transaminases are elevated. ALP is normal.  Liver function is normal.  The platelet count is normal.  These values will be repeated to day to see the impact of her weight losses. There is currently no FDA approved medical treatment for fatty liver, NALFD or KELSEY. There is no medication or vitamin supplements that we advocate for KELSEY. Using glitazones in patients without diabetes mellitus has been shown to reduce fat content in the liver but has no effect on fibrosis and is associated with weight gain. Vitamin E has also been used but the data is not very good and most experts no longer advocate this. The only medical treatments for KELSEY are though clinical trials. The patient would like to participate in a clinical trial, will consider her for inclusion in future. I will forward her information onto studies for review. 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 This single mutation can cause an elevation in ferritin but it does not lead to iron overload in the liver or other organs. A liver biopsy does not demonstrate evidence of iron overload. Treatment of other medical problems in patients with chronic liver disease  There are no contraindications for the patient to take most medications that are necessary for treatment of other medical issues. The patient can take the following medications as these will not impact the patient's current liver disease.   Any medications utilized for treatment of DM  Statins to treat hypercholesterolemia or triglycerides, as above. The patient does not comsume alcohol on a daily basis. Normal doses of acetaminophen, as recommended on the label of the bottle, are not hepatotoxic except in the setting of daily alcohol use, even in patients with cirrhosis and can be utilized for pain. Counseling for alcohol in patients with chronic liver disease  The patient was counseled regarding alcohol consumption and the effect of alcohol on chronic liver disease. The patient does not consume any significant amount of alcohol. Vaccinations   The need for vaccination against viral hepatitis A and B will be assessed with serologic and instituted as appropriate. Routine vaccinations against other bacterial and viral agents can be performed as indicated. Annual flu vaccination should be administered if indicated. She has had full COVID vaccination. ALLERGIES  Allergies   Allergen Reactions    Augmentin [Amoxicillin-Pot Clavulanate] Other (comments)     Tachycardia    Codeine Other (comments)     Itch/rash    Morphine Itching    Niaspan [Niacin] Rash    Pcn [Penicillins] Rash and Other (comments)     Nose Bleed    Sulfa Dyne Unknown (comments)     Can't remember reaction    Tetracycline Rash     Facial Rash       MEDICATIONS  Current Outpatient Medications   Medication Sig    amLODIPine (NORVASC) 5 mg tablet     icosapent ethyL (VASCEPA) 1 gram capsule TAKE 2 CAPSULES BY MOUTH TWICE DAILY WITH MEALS FOR 30 DAYS    cephALEXin (KEFLEX) 500 mg capsule Take 500 mg by mouth four (4) times daily.  multivitamin (ONE A DAY) tablet Take 1 Tab by mouth daily.  levothyroxine (SYNTHROID) 88 mcg tablet Take 88 mcg by mouth Daily (before breakfast). Indications: hypothyroidism    losartan (COZAAR) 100 mg tablet Take 100 mg by mouth daily. Indications: high blood pressure     No current facility-administered medications for this visit.        SYSTEM REVIEW NOT RELATED TO LIVER DISEASE OR REVIEWED ABOVE:  Constitution systems: Negative for fever, chills. Weight up ~12# in the past 6 months.   since last office visit. Eyes: Negative for visual changes. ENT: Negative for sore throat, painful swallowing. Respiratory: Negative for cough, hemoptysis, SOB. Cardiology: Negative for chest pain, palpitations. GI:  Negative for constipation or diarrhea. : Negative for urinary frequency, dysuria, hematuria, nocturia. Skin: Negative for rash. Hematology: Negative for easy bruising, blood clots. Musculo-skeletal: Negative for back pain, muscle pain, weakness. Neurologic: Negative for headaches, dizziness, vertigo, memory problems not related to HE. Psychology: Negative for anxiety, depression. FAMILY HISTORY:  The father  of MI. The mother  of lung cancer. There is no family history of liver disease. SOCIAL HISTORY:  The patient is . The patient has 2 children, and 2 grandchildren. The patient has never used tobacco products. The patient has never consumed significant amounts of alcohol. The patient currently works part time as . PHYSICAL EXAMINATION:  VS: per nursing note  General: No acute distress. Eyes: Sclera anicteric. ENT: No oral lesions. Thyroid normal.  Nodes: No adenopathy. Skin: No spider angiomata. No jaundice. No palmar erythema. Respiratory: Lungs clear to auscultation. Cardiovascular: Regular heart rate. No murmurs. No JVD. Abdomen: Soft non-tender, liver size normal to percussion/palpation. Spleen not palpable. No obvious ascites. Extremities: No edema. No muscle wasting. No gross arthritic changes. Neurologic: Alert and oriented. Cranial nerves grossly intact. No asterixis.     LABORATORY STUDIES:  Liver [place] of [address] Units 2020   WBC 3.4 - 10.8 x10E3/uL 8.8    HGB 11.1 - 15.9 g/dL 14.9     - 450 x10E3/uL 377    AST 0 - 40 IU/L 21 18   ALT 0 - 32 IU/L 21 19 Alk Phos 39 - 117 IU/L 87 74   Bili, Total 0.0 - 1.2 mg/dL 0.6 0.6   Bili, Direct 0.00 - 0.40 mg/dL 0.17 0.16   Albumin 3.8 - 4.8 g/dL 4.6 4.7   BUN 8 - 27 mg/dL 10    Creat 0.57 - 1.00 mg/dL 0.80    Na 134 - 144 mmol/L 142    K 3.5 - 5.2 mmol/L 5.7 (H)    Cl 96 - 106 mmol/L 103    CO2 20 - 29 mmol/L 25    Glucose 65 - 99 mg/dL 111 (H)      Liver [place] of [address] [place] [address] Ref Rng & Units 5/29/2019   WBC 3.4 - 10.8 x10E3/uL 7.8   HGB 11.1 - 15.9 g/dL 15.5    - 450 x10E3/uL 345   AST 0 - 40 IU/L 23   ALT 0 - 32 IU/L 19   Alk Phos 39 - 117 IU/L 78   Bili, Total 0.0 - 1.2 mg/dL 0.6   Bili, Direct 0.00 - 0.40 mg/dL 0.18   Albumin 3.8 - 4.8 g/dL 4.8   BUN 8 - 27 mg/dL 12   Creat 0.57 - 1.00 mg/dL 0.73   Na 134 - 144 mmol/L 141   K 3.5 - 5.2 mmol/L 5.0   Cl 96 - 106 mmol/L 101   CO2 20 - 29 mmol/L 22   Glucose 65 - 99 mg/dL 95     From 9/2018  AST/ALT/ALP/T Bili/ALB:  96/107/70/0.7/3.3  WBC/HB/PLT/INR:  8.9/14.7/351  BUN/CREAT:  10/0.79    Additional lab values drawn at today's office visit are pending at the time of documentation. SEROLOGIES:  10/2018. Ferritin 460, HFE genetic testing sinlge mutation C282Y, HANNAH negative, ASMA negative. LIVER HISTOLOGY:  10/2018. Liver biopsy from [address] [address].  KELSEY with stage 2-3 fibrosis. 9/2019. FibroScan performed at [address]. EkPa was 7.2. Suggested fibrosis level is F1-2. CAP score is 380, this is consistent with steatosis. 8/2021. FibroScan performed at [address]. EkPa was 5.9. Suggested fibrosis level is F1. CAP score is 279, this is consistent with steatosis. ENDOSCOPIC PROCEDURES:  9/2018. EGD by Dr Richa Gamino. Normal.    RADIOLOGY:  10/2018. MRI of the liver. Changes consistent with fatty liver. No liver mass lesions. Normal spleen. No ascites. OTHER TESTING:  Not available or performed    FOLLOW-UP:  All of the issues listed above in the Assessment and Plan were discussed with the patient.   All questions were answered. The patient expressed a clear understanding of the above. [address] [facility] in 6 months for monitoring of weight loss and liver functions.      Ashley Messina PA-C  [facility] of [address] [address] Luis Vazquez, [address] [place] [address] 22.  [phone]  [address] [place] [address]

## 2021-08-10 NOTE — PROGRESS NOTES
Identified pt with two pt identifiers(name and ). Reviewed record in preparation for visit and have obtained necessary documentation. No chief complaint on file. Vitals:    08/10/21 1155   BP: 125/65   Pulse: 76   Resp: 16   Temp: 96.8 °F (36 °C)   TempSrc: Temporal   SpO2: 99%   Weight: 186 lb 9.6 oz (84.6 kg)   Height: 5' 4\" (1.626 m)   PainSc:   0 - No pain       Health Maintenance Review: Patient reminded of \"due or due soon\" health maintenance. I have asked the patient to contact his/her primary care provider (PCP) for follow-up on his/her health maintenance. Coordination of Care Questionnaire:  :   1) Have you been to an emergency room, urgent care, or hospitalized since your last visit? If yes, where when, and reason for visit? no       2. Have seen or consulted any other health care provider since your last visit? If yes, where when, and reason for visit? YES, PCP for medication issue    Patient is accompanied by self I have received verbal consent from [address] [place] [address]. Hood to discuss any/all medical information while they are present in the room.

## 2021-08-11 LAB
AFP L3 MFR SERPL: NORMAL % (ref 0–9.9)
AFP SERPL-MCNC: 3 NG/ML (ref 0–8)
ALBUMIN SERPL-MCNC: 4.8 G/DL (ref 3.8–4.8)
ALP SERPL-CCNC: 93 IU/L (ref 48–121)
ALT SERPL-CCNC: 18 IU/L (ref 0–32)
AST SERPL-CCNC: 20 IU/L (ref 0–40)
BILIRUB DIRECT SERPL-MCNC: 0.18 MG/DL (ref 0–0.4)
BILIRUB SERPL-MCNC: 0.6 MG/DL (ref 0–1.2)
BUN SERPL-MCNC: 10 MG/DL (ref 8–27)
BUN/CREAT SERPL: 13 (ref 12–28)
CALCIUM SERPL-MCNC: 9.9 MG/DL (ref 8.7–10.3)
CHLORIDE SERPL-SCNC: 102 MMOL/L (ref 96–106)
CHOLEST SERPL-MCNC: 131 MG/DL (ref 100–199)
CO2 SERPL-SCNC: 25 MMOL/L (ref 20–29)
CREAT SERPL-MCNC: 0.77 MG/DL (ref 0.57–1)
ERYTHROCYTE [DISTWIDTH] IN BLOOD BY AUTOMATED COUNT: 13.6 % (ref 11.7–15.4)
GLUCOSE SERPL-MCNC: 108 MG/DL (ref 65–99)
HCT VFR BLD AUTO: 43.5 % (ref 34–46.6)
HDLC SERPL-MCNC: 36 MG/DL
HGB BLD-MCNC: 14.8 G/DL (ref 11.1–15.9)
INR PPP: 1 (ref 0.9–1.2)
LDLC SERPL CALC-MCNC: 44 MG/DL (ref 0–99)
MCH RBC QN AUTO: 30 PG (ref 26.6–33)
MCHC RBC AUTO-ENTMCNC: 34 G/DL (ref 31.5–35.7)
MCV RBC AUTO: 88 FL (ref 79–97)
PLATELET # BLD AUTO: 379 X10E3/UL (ref 150–450)
POTASSIUM SERPL-SCNC: 4.6 MMOL/L (ref 3.5–5.2)
PROT SERPL-MCNC: 7.4 G/DL (ref 6–8.5)
PROTHROMBIN TIME: 10.4 SEC (ref 9.1–12)
RBC # BLD AUTO: 4.93 X10E6/UL (ref 3.77–5.28)
SODIUM SERPL-SCNC: 144 MMOL/L (ref 134–144)
TRIGL SERPL-MCNC: 339 MG/DL (ref 0–149)
VLDLC SERPL CALC-MCNC: 51 MG/DL (ref 5–40)
WBC # BLD AUTO: 9.8 X10E3/UL (ref 3.4–10.8)

## 2021-08-12 LAB
A2 MACROGLOB SERPL-MCNC: 264 MG/DL (ref 110–276)
ALT SERPL W P-5'-P-CCNC: 21 IU/L (ref 0–40)
APO A-I SERPL-MCNC: 143 MG/DL (ref 116–209)
AST SERPL W P-5'-P-CCNC: 27 IU/L (ref 0–40)
BILIRUB SERPL-MCNC: 0.3 MG/DL (ref 0–1.2)
CHOLEST SERPL-MCNC: 139 MG/DL (ref 100–199)
COMMENT:: ABNORMAL
FIBROSIS SCORING:, 550107: ABNORMAL
FIBROSIS STAGE SERPL QL: ABNORMAL
GGT SERPL-CCNC: 18 IU/L (ref 0–60)
GLUCOSE SERPL-MCNC: 107 MG/DL (ref 65–99)
HAPTOGLOB SERPL-MCNC: 197 MG/DL (ref 37–355)
INTERPRETATIONS:, 550143: ABNORMAL
LIVER FIBR SCORE SERPL CALC.FIBROSURE: 0.22 (ref 0–0.21)
NASH SCORING, 550144: ABNORMAL
NECROINFLAMMATORY ACT GRADE SERPL QL: ABNORMAL
NECROINFLAMMATORY ACT SCORE SERPL: 0.75
SERVICE CMNT-IMP: ABNORMAL
STEATOSIS GRADE, 550153: ABNORMAL
STEATOSIS GRADING, 550189: ABNORMAL
STEATOSIS SCORE, 550149: 0.64 (ref 0–0.3)
TRIGL SERPL-MCNC: 367 MG/DL (ref 0–149)

## 2021-08-17 NOTE — PROGRESS NOTES
Pt notified via letter of stable liver findings and NAFLD. Will forward to PCP for further discussion on management of TG.

## 2022-02-09 ENCOUNTER — OFFICE VISIT (OUTPATIENT)
Dept: HEMATOLOGY | Age: 72
End: 2022-02-09
Payer: MEDICARE

## 2022-02-09 VITALS
TEMPERATURE: 96.7 F | HEIGHT: 64 IN | DIASTOLIC BLOOD PRESSURE: 66 MMHG | SYSTOLIC BLOOD PRESSURE: 139 MMHG | RESPIRATION RATE: 16 BRPM | OXYGEN SATURATION: 96 % | WEIGHT: 189 LBS | BODY MASS INDEX: 32.27 KG/M2 | HEART RATE: 67 BPM

## 2022-02-09 DIAGNOSIS — K75.81 NASH (NONALCOHOLIC STEATOHEPATITIS): Primary | ICD-10-CM

## 2022-02-09 LAB
ALBUMIN SERPL-MCNC: 4.2 G/DL (ref 3.5–5)
ALBUMIN/GLOB SERPL: 1.2 {RATIO} (ref 1.1–2.2)
ALP SERPL-CCNC: 97 U/L (ref 45–117)
ALT SERPL-CCNC: 31 U/L (ref 12–78)
ANION GAP SERPL CALC-SCNC: 6 MMOL/L (ref 5–15)
AST SERPL-CCNC: 24 U/L (ref 15–37)
BILIRUB DIRECT SERPL-MCNC: 0.2 MG/DL (ref 0–0.2)
BILIRUB SERPL-MCNC: 0.6 MG/DL (ref 0.2–1)
BUN SERPL-MCNC: 9 MG/DL (ref 6–20)
BUN/CREAT SERPL: 10 (ref 12–20)
CALCIUM SERPL-MCNC: 9.6 MG/DL (ref 8.5–10.1)
CHLORIDE SERPL-SCNC: 104 MMOL/L (ref 97–108)
CHOLEST SERPL-MCNC: 139 MG/DL
CO2 SERPL-SCNC: 27 MMOL/L (ref 21–32)
CREAT SERPL-MCNC: 0.86 MG/DL (ref 0.55–1.02)
ERYTHROCYTE [DISTWIDTH] IN BLOOD BY AUTOMATED COUNT: 13.4 % (ref 11.5–14.5)
GLOBULIN SER CALC-MCNC: 3.4 G/DL (ref 2–4)
GLUCOSE SERPL-MCNC: 112 MG/DL (ref 65–100)
HCT VFR BLD AUTO: 47.4 % (ref 35–47)
HDLC SERPL-MCNC: 39 MG/DL
HDLC SERPL: 3.6 {RATIO} (ref 0–5)
HGB BLD-MCNC: 15.2 G/DL (ref 11.5–16)
LDLC SERPL CALC-MCNC: 37.6 MG/DL (ref 0–100)
MCH RBC QN AUTO: 29.9 PG (ref 26–34)
MCHC RBC AUTO-ENTMCNC: 32.1 G/DL (ref 30–36.5)
MCV RBC AUTO: 93.3 FL (ref 80–99)
NRBC # BLD: 0 K/UL (ref 0–0.01)
NRBC BLD-RTO: 0 PER 100 WBC
PLATELET # BLD AUTO: 399 K/UL (ref 150–400)
PMV BLD AUTO: 9.9 FL (ref 8.9–12.9)
POTASSIUM SERPL-SCNC: 4.9 MMOL/L (ref 3.5–5.1)
PROT SERPL-MCNC: 7.6 G/DL (ref 6.4–8.2)
RBC # BLD AUTO: 5.08 M/UL (ref 3.8–5.2)
SODIUM SERPL-SCNC: 137 MMOL/L (ref 136–145)
TRIGL SERPL-MCNC: 312 MG/DL (ref ?–150)
VLDLC SERPL CALC-MCNC: 62.4 MG/DL
WBC # BLD AUTO: 8.8 K/UL (ref 3.6–11)

## 2022-02-09 PROCEDURE — G8754 DIAS BP LESS 90: HCPCS | Performed by: PHYSICIAN ASSISTANT

## 2022-02-09 PROCEDURE — 3017F COLORECTAL CA SCREEN DOC REV: CPT | Performed by: PHYSICIAN ASSISTANT

## 2022-02-09 PROCEDURE — 1090F PRES/ABSN URINE INCON ASSESS: CPT | Performed by: PHYSICIAN ASSISTANT

## 2022-02-09 PROCEDURE — 1101F PT FALLS ASSESS-DOCD LE1/YR: CPT | Performed by: PHYSICIAN ASSISTANT

## 2022-02-09 PROCEDURE — G8536 NO DOC ELDER MAL SCRN: HCPCS | Performed by: PHYSICIAN ASSISTANT

## 2022-02-09 PROCEDURE — G8752 SYS BP LESS 140: HCPCS | Performed by: PHYSICIAN ASSISTANT

## 2022-02-09 PROCEDURE — G8417 CALC BMI ABV UP PARAM F/U: HCPCS | Performed by: PHYSICIAN ASSISTANT

## 2022-02-09 PROCEDURE — G8427 DOCREV CUR MEDS BY ELIG CLIN: HCPCS | Performed by: PHYSICIAN ASSISTANT

## 2022-02-09 PROCEDURE — G8432 DEP SCR NOT DOC, RNG: HCPCS | Performed by: PHYSICIAN ASSISTANT

## 2022-02-09 PROCEDURE — G0463 HOSPITAL OUTPT CLINIC VISIT: HCPCS | Performed by: PHYSICIAN ASSISTANT

## 2022-02-09 PROCEDURE — 99214 OFFICE O/P EST MOD 30 MIN: CPT | Performed by: PHYSICIAN ASSISTANT

## 2022-02-09 PROCEDURE — G8400 PT W/DXA NO RESULTS DOC: HCPCS | Performed by: PHYSICIAN ASSISTANT

## 2022-02-09 NOTE — PROGRESS NOTES
[facility], [place], [address], [place], [place]      Zain Pimentel, JESSICA Go, ACN-BC     April S Erika, [facility]   Olga Casanova FNP-BERNARDO Martínez, [facility]       Diann Deputado L[address]    at [facility]    [address] [facility], [address] [place] [address], [facility] 22.    [phone]    FAX: [address] [place] [address]    at [address], [address], [address]    [phone]    FAX: [phone]       Patient Care Team:  Disha Angeles MD as PCP - General (Family Medicine)  Disha Angeles MD as PCP - [facility]  Blade Rabago MD (Gastroenterology)  Jj Rowan MD (General Surgery)      Problem List  Date Reviewed: 8/10/2021          Codes Class Noted    Sleep apnea ICD-10-CM: G47.30  ICD-9-CM: 780.57  1/26/2019        KELSEY (nonalcoholic steatohepatitis) ICD-10-CM: K75.81  ICD-9-CM: 571.8  1/25/2019        Diabetes mellitus, type 2 ([facility] ICD-10-CM: E11.9  ICD-9-CM: 250.00  1/25/2019        Hypertension ICD-10-CM: I10  ICD-9-CM: 401.9  1/25/2019        Hypothyroidism ICD-10-CM: E03.9  ICD-9-CM: 244.9  1/25/2019        H/O carpal tunnel repair ICD-10-CM: Z98.890  ICD-9-CM: V15.29  1/25/2019        S/P CARIDAD (total abdominal hysterectomy) ICD-10-CM: Z90.710  ICD-9-CM: V88.01  1/25/2019        History of cholecystectomy ICD-10-CM: Z90.49  ICD-9-CM: V45.79  1/25/2019        Kidney stones ICD-10-CM: N20.0  ICD-9-CM: 592.0  1/25/2019        H/O total knee replacement ICD-10-CM: F70.242  ICD-9-CM: V43.65  8/26/2013            Ingrid Caban Kenyatta returns to the [place] [address] for management of non-alcoholic steatohepatitis (KELSEY).  The active problem list, all pertinent past medical history, medications, radiologic findings and laboratory findings related to the liver disorder were reviewed with the patient. The patient is a 73 y.o.  female who was found to have KELSEY, stage 2-3, on liver biopsy in 10/2018. This was following noted elevation of liver enzymes. Serologic evaluation for markers of chronic liver disease was positive for HFE single mutation, otherwise negative. She had MRI performed in 10/2018. Results suggest fatty liver disease. A liver biopsy was performed in 10/2018. This demonstrated KELSEY with stage 2-3 fibrosis. Over the course of the past 2.5  years she has had weight gain of ~12-15#. Prior to this, she had ~30# weight loss. She stopped keto diet due to her elevation in TGs in 3/2021 or so at the suggestion of her PCP. She was then given a trial of Vascepa, which caused abdominal bloating and nausea and she discontinued therapy after a 3 month trial ~5/2021. Liver enzymes have normalized on past assessments with associated weight loss. Fibroscan assessment performed at last office visit showed EkPa was 5.9. Suggested fibrosis level is F1. CAP score is 279, this is consistent with steatosis. The patient has no symptoms which can be attributed to the liver disorder. The patient has not experienced fatigue, pain in the right side over the liver. The patient completes all daily activities without any functional limitations. ASSESSMENT AND PLAN:  KELSEY  The diagnosis is based upon liver biopsy. A liver biopsy performed in 10/2018 shows stage 2-3 fibrosis. She has lost a significant amount of weight in the past and had largely normalized the liver enzymes. Her recent Fibroscan would suggest that there may have been down-staging of fibrosis as well. I have encouraged her continued goals and she relates that she is feeling better overall. She has a goal of ~165#. I have reviewed in detail with her the results of the study today and will repeat liver enzymes and lipid panel for monitoring.  I will forward this information to the patient and her PCP when available. I have discussed in detail with her that if she is found to have elevation in TG, there is no limitation on the basis of her liver disease with regard to medication selection. She has requested repeat assessment with labs today. Liver transaminases are elevated. ALP is normal.  Liver function is normal.  The platelet count is normal.  These values will be repeated to day to see the impact of her weight losses. There is currently no FDA approved medical treatment for fatty liver, NALFD or KELSEY. There is no medication or vitamin supplements that we advocate for KELSEY. Using glitazones in patients without diabetes mellitus has been shown to reduce fat content in the liver but has no effect on fibrosis and is associated with weight gain. Vitamin E has also been used but the data is not very good and most experts no longer advocate this. The only medical treatments for KELSEY are though clinical trials. The patient would like to participate in a clinical trial, will consider her for inclusion in future. I will forward her information onto studies for review. 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 This single mutation can cause an elevation in ferritin but it does not lead to iron overload in the liver or other organs. A liver biopsy does not demonstrate evidence of iron overload. Treatment of other medical problems in patients with chronic liver disease  There are no contraindications for the patient to take most medications that are necessary for treatment of other medical issues. The patient can take the following medications as these will not impact the patient's current liver disease. Any medications utilized for treatment of DM  Statins to treat hypercholesterolemia or triglycerides, as above. The patient does not comsume alcohol on a daily basis.   Normal doses of acetaminophen, as recommended on the label of the bottle, are not hepatotoxic except in the setting of daily alcohol use, even in patients with cirrhosis and can be utilized for pain. Counseling for alcohol in patients with chronic liver disease  The patient was counseled regarding alcohol consumption and the effect of alcohol on chronic liver disease. The patient does not consume any significant amount of alcohol. Vaccinations   The need for vaccination against viral hepatitis A and B will be assessed with serologic and instituted as appropriate. Routine vaccinations against other bacterial and viral agents can be performed as indicated. Annual flu vaccination should be administered if indicated. She has had full COVID vaccination. ALLERGIES  Allergies   Allergen Reactions    Augmentin [Amoxicillin-Pot Clavulanate] Other (comments)     Tachycardia    Codeine Other (comments)     Itch/rash    Morphine Itching    Niaspan [Niacin] Rash    Pcn [Penicillins] Rash and Other (comments)     Nose Bleed    Sulfa Dyne Unknown (comments)     Can't remember reaction    Tetracycline Rash     Facial Rash       MEDICATIONS  Current Outpatient Medications   Medication Sig    amLODIPine (NORVASC) 5 mg tablet     icosapent ethyL (VASCEPA) 1 gram capsule TAKE 2 CAPSULES BY MOUTH TWICE DAILY WITH MEALS FOR 30 DAYS (Patient not taking: Reported on 8/10/2021)    cephALEXin (KEFLEX) 500 mg capsule Take 500 mg by mouth four (4) times daily. (Patient not taking: Reported on 8/10/2021)    multivitamin (ONE A DAY) tablet Take 1 Tab by mouth daily.  levothyroxine (SYNTHROID) 88 mcg tablet Take 88 mcg by mouth Daily (before breakfast). Indications: hypothyroidism    losartan (COZAAR) 100 mg tablet Take 100 mg by mouth daily. Indications: high blood pressure     No current facility-administered medications for this visit.        SYSTEM REVIEW NOT RELATED TO LIVER DISEASE OR REVIEWED ABOVE:  Constitution systems: Negative for fever, chills. Weight stable since last office visit. Eyes: Negative for visual changes. ENT: Negative for sore throat, painful swallowing. Respiratory: Negative for cough, hemoptysis, SOB. Cardiology: Negative for chest pain, palpitations. GI:  Negative for constipation or diarrhea. : Negative for urinary frequency, dysuria, hematuria, nocturia. Skin: Negative for rash. Hematology: Negative for easy bruising, blood clots. Musculo-skeletal: Negative for back pain, muscle pain, weakness. Neurologic: Negative for headaches, dizziness, vertigo, memory problems not related to HE. Psychology: Negative for anxiety, depression. FAMILY HISTORY:  The father  of MI. The mother  of lung cancer. There is no family history of liver disease. SOCIAL HISTORY:  The patient is . The patient has 2 children, and 2 grandchildren. The patient has never used tobacco products. The patient has never consumed significant amounts of alcohol. The patient currently works part time as  - she has had increased hours. PHYSICAL EXAMINATION:  VS: per nursing note  General: No acute distress. Eyes: Sclera anicteric. ENT: No oral lesions. Thyroid normal.  Nodes: No adenopathy. Skin: No spider angiomata. No jaundice. No palmar erythema. Respiratory: Lungs clear to auscultation. Cardiovascular: Regular heart rate. No murmurs. No JVD. Abdomen: Soft non-tender, liver size normal to percussion/palpation. Spleen not palpable. No obvious ascites. Extremities: No edema. No muscle wasting. No gross arthritic changes. Neurologic: Alert and oriented. Cranial nerves grossly intact. No asterixis.     LABORATORY STUDIES:  [facility] of [address] [place] [address] Units 8/10/2021 2020   WBC 3.4 - 10.8 x10E3/uL 9.8 8.8   HGB 11.1 - 15.9 g/dL 14.8 14.9    - 450 x10E3/uL 379 377   INR 0.9 - 1.2 1.0    AST 0 - 40 IU/L 20 21   ALT 0 - 40 IU/L 18 21   Alk Phos 48 - 121 IU/L 93 87   Bili, Total 0.0 - 1.2 mg/dL 0.6 0.6   Bili, Direct 0.00 - 0.40 mg/dL 0.18 0.17   Albumin 3.8 - 4.8 g/dL 4.8 4.6   BUN 8 - 27 mg/dL 10 10   Creat 0.57 - 1.00 mg/dL 0.77 0.80   Na 134 - 144 mmol/L 144 142   K 3.5 - 5.2 mmol/L 4.6 5.7 (H)   Cl 96 - 106 mmol/L 102 103   CO2 20 - 29 mmol/L 25 25   Glucose 65 - 99 mg/dL 108 (H) 111 (H)   From 9/2018  AST/ALT/ALP/T Bili/ALB:  96/107/70/0.7/3.3  WBC/HB/PLT/INR:  8.9/14.7/351  BUN/CREAT:  10/0.79    Additional lab values drawn at today's office visit are pending at the time of documentation. SEROLOGIES:  10/2018. Ferritin 460, HFE genetic testing sinlge mutation C282Y, HANNAH negative, ASMA negative. LIVER HISTOLOGY:  10/2018. Liver biopsy from [address] [place] [address].  KELSEY with stage 2-3 fibrosis. 9/2019. FibroScan performed at [facility]. EkPa was 7.2. Suggested fibrosis level is F1-2. CAP score is 380, this is consistent with steatosis. 8/2021. FibroScan performed at [facility]. EkPa was 5.9. Suggested fibrosis level is F1. CAP score is 279, this is consistent with steatosis. ENDOSCOPIC PROCEDURES:  9/2018. EGD by Dr Leia Wagoner. Normal.    RADIOLOGY:  10/2018. MRI of the liver. Changes consistent with fatty liver. No liver mass lesions. Normal spleen. No ascites. OTHER TESTING:  Not available or performed    FOLLOW-UP:  All of the issues listed above in the Assessment and Plan were discussed with the patient. All questions were answered. The patient expressed a clear understanding of the above. [facility] 87 in 6 months for monitoring of weight loss and liver functions. Will plan repeat FS at that time.      Zain Pimentel PA-C  [facility] of [address] [facility] [address] [address] Luis Vazquez, [address] [facility] 22.  [phone]  [address] [facility]

## 2022-02-09 NOTE — PROGRESS NOTES
Identified pt with two pt identifiers(name and ). Reviewed record in preparation for visit and have obtained necessary documentation. Chief Complaint   Patient presents with    Fatty Liver     6 mo f/u      Vitals:    22 1021   BP: (!) 145/67   Pulse: 67   Resp: 16   Temp: (!) 96.7 °F (35.9 °C)   TempSrc: Temporal   SpO2: 96%   Weight: 189 lb (85.7 kg)   Height: 5' 4\" (1.626 m)   PainSc:   0 - No pain       Health Maintenance Review: Patient reminded of \"due or due soon\" health maintenance. I have asked the patient to contact his/her primary care provider (PCP) for follow-up on his/her health maintenance. Coordination of Care Questionnaire:  :   1) Have you been to an emergency room, urgent care, or hospitalized since your last visit? If yes, where when, and reason for visit? no       2. Have seen or consulted any other health care provider since your last visit? If yes, where when, and reason for visit? NO      Patient is accompanied by self I have received verbal consent from [address] [place] [address]. Hood to discuss any/all medical information while they are present in the room.

## 2022-03-18 PROBLEM — E03.9 HYPOTHYROIDISM: Status: ACTIVE | Noted: 2019-01-25

## 2022-03-18 PROBLEM — K75.81 NASH (NONALCOHOLIC STEATOHEPATITIS): Status: ACTIVE | Noted: 2019-01-25

## 2022-03-18 PROBLEM — G47.30 SLEEP APNEA: Status: ACTIVE | Noted: 2019-01-26

## 2022-03-19 PROBLEM — Z90.710 S/P TAH (TOTAL ABDOMINAL HYSTERECTOMY): Status: ACTIVE | Noted: 2019-01-25

## 2022-03-19 PROBLEM — E11.9 DIABETES MELLITUS, TYPE 2 (HCC): Status: ACTIVE | Noted: 2019-01-25

## 2022-03-19 PROBLEM — I10 HYPERTENSION: Status: ACTIVE | Noted: 2019-01-25

## 2022-03-20 PROBLEM — Z98.890 H/O CARPAL TUNNEL REPAIR: Status: ACTIVE | Noted: 2019-01-25

## 2022-03-20 PROBLEM — N20.0 KIDNEY STONES: Status: ACTIVE | Noted: 2019-01-25

## 2022-03-20 PROBLEM — Z90.49 HISTORY OF CHOLECYSTECTOMY: Status: ACTIVE | Noted: 2019-01-25

## 2022-08-16 ENCOUNTER — OFFICE VISIT (OUTPATIENT)
Dept: HEMATOLOGY | Age: 72
End: 2022-08-16
Payer: MEDICARE

## 2022-08-16 VITALS
BODY MASS INDEX: 32.3 KG/M2 | SYSTOLIC BLOOD PRESSURE: 162 MMHG | HEART RATE: 62 BPM | WEIGHT: 189.2 LBS | TEMPERATURE: 97.9 F | DIASTOLIC BLOOD PRESSURE: 78 MMHG | OXYGEN SATURATION: 98 % | HEIGHT: 64 IN

## 2022-08-16 DIAGNOSIS — K75.81 NASH (NONALCOHOLIC STEATOHEPATITIS): Primary | ICD-10-CM

## 2022-08-16 LAB
ALBUMIN SERPL-MCNC: 4.3 G/DL (ref 3.5–5)
ALBUMIN/GLOB SERPL: 1.2 {RATIO} (ref 1.1–2.2)
ALP SERPL-CCNC: 81 U/L (ref 45–117)
ALT SERPL-CCNC: 45 U/L (ref 12–78)
ANION GAP SERPL CALC-SCNC: 7 MMOL/L (ref 5–15)
AST SERPL-CCNC: 31 U/L (ref 15–37)
BASOPHILS # BLD: 0.1 K/UL (ref 0–0.1)
BASOPHILS NFR BLD: 1 % (ref 0–1)
BILIRUB DIRECT SERPL-MCNC: 0.2 MG/DL (ref 0–0.2)
BILIRUB SERPL-MCNC: 0.8 MG/DL (ref 0.2–1)
BUN SERPL-MCNC: 14 MG/DL (ref 6–20)
BUN/CREAT SERPL: 17 (ref 12–20)
CALCIUM SERPL-MCNC: 9.9 MG/DL (ref 8.5–10.1)
CHLORIDE SERPL-SCNC: 104 MMOL/L (ref 97–108)
CO2 SERPL-SCNC: 28 MMOL/L (ref 21–32)
CREAT SERPL-MCNC: 0.84 MG/DL (ref 0.55–1.02)
DIFFERENTIAL METHOD BLD: ABNORMAL
EOSINOPHIL # BLD: 0.2 K/UL (ref 0–0.4)
EOSINOPHIL NFR BLD: 2 % (ref 0–7)
ERYTHROCYTE [DISTWIDTH] IN BLOOD BY AUTOMATED COUNT: 13.2 % (ref 11.5–14.5)
GLOBULIN SER CALC-MCNC: 3.5 G/DL (ref 2–4)
GLUCOSE SERPL-MCNC: 111 MG/DL (ref 65–100)
HCT VFR BLD AUTO: 46.9 % (ref 35–47)
HGB BLD-MCNC: 15.7 G/DL (ref 11.5–16)
IMM GRANULOCYTES # BLD AUTO: 0.1 K/UL (ref 0–0.04)
IMM GRANULOCYTES NFR BLD AUTO: 1 % (ref 0–0.5)
INR PPP: 1 (ref 0.9–1.1)
LYMPHOCYTES # BLD: 2.9 K/UL (ref 0.8–3.5)
LYMPHOCYTES NFR BLD: 34 % (ref 12–49)
MCH RBC QN AUTO: 30.5 PG (ref 26–34)
MCHC RBC AUTO-ENTMCNC: 33.5 G/DL (ref 30–36.5)
MCV RBC AUTO: 91.1 FL (ref 80–99)
MONOCYTES # BLD: 0.6 K/UL (ref 0–1)
MONOCYTES NFR BLD: 7 % (ref 5–13)
NEUTS SEG # BLD: 4.7 K/UL (ref 1.8–8)
NEUTS SEG NFR BLD: 55 % (ref 32–75)
NRBC # BLD: 0 K/UL (ref 0–0.01)
NRBC BLD-RTO: 0 PER 100 WBC
PLATELET # BLD AUTO: 391 K/UL (ref 150–400)
PMV BLD AUTO: 9.5 FL (ref 8.9–12.9)
POTASSIUM SERPL-SCNC: 4.4 MMOL/L (ref 3.5–5.1)
PROT SERPL-MCNC: 7.8 G/DL (ref 6.4–8.2)
PROTHROMBIN TIME: 10.8 SEC (ref 9–11.1)
RBC # BLD AUTO: 5.15 M/UL (ref 3.8–5.2)
SODIUM SERPL-SCNC: 139 MMOL/L (ref 136–145)
WBC # BLD AUTO: 8.5 K/UL (ref 3.6–11)

## 2022-08-16 PROCEDURE — 1123F ACP DISCUSS/DSCN MKR DOCD: CPT | Performed by: PHYSICIAN ASSISTANT

## 2022-08-16 PROCEDURE — 91200 LIVER ELASTOGRAPHY: CPT | Performed by: PHYSICIAN ASSISTANT

## 2022-08-16 PROCEDURE — 99214 OFFICE O/P EST MOD 30 MIN: CPT | Performed by: PHYSICIAN ASSISTANT

## 2022-08-16 PROCEDURE — G8754 DIAS BP LESS 90: HCPCS | Performed by: PHYSICIAN ASSISTANT

## 2022-08-16 PROCEDURE — 1090F PRES/ABSN URINE INCON ASSESS: CPT | Performed by: PHYSICIAN ASSISTANT

## 2022-08-16 PROCEDURE — G8400 PT W/DXA NO RESULTS DOC: HCPCS | Performed by: PHYSICIAN ASSISTANT

## 2022-08-16 PROCEDURE — G8753 SYS BP > OR = 140: HCPCS | Performed by: PHYSICIAN ASSISTANT

## 2022-08-16 PROCEDURE — 3017F COLORECTAL CA SCREEN DOC REV: CPT | Performed by: PHYSICIAN ASSISTANT

## 2022-08-16 PROCEDURE — 1101F PT FALLS ASSESS-DOCD LE1/YR: CPT | Performed by: PHYSICIAN ASSISTANT

## 2022-08-16 PROCEDURE — G8427 DOCREV CUR MEDS BY ELIG CLIN: HCPCS | Performed by: PHYSICIAN ASSISTANT

## 2022-08-16 PROCEDURE — G8417 CALC BMI ABV UP PARAM F/U: HCPCS | Performed by: PHYSICIAN ASSISTANT

## 2022-08-16 PROCEDURE — G8536 NO DOC ELDER MAL SCRN: HCPCS | Performed by: PHYSICIAN ASSISTANT

## 2022-08-16 PROCEDURE — G0463 HOSPITAL OUTPT CLINIC VISIT: HCPCS | Performed by: PHYSICIAN ASSISTANT

## 2022-08-16 PROCEDURE — G8432 DEP SCR NOT DOC, RNG: HCPCS | Performed by: PHYSICIAN ASSISTANT

## 2022-08-16 NOTE — PROGRESS NOTES
Identified pt with two pt identifiers(name and ). Reviewed record in preparation for visit and have obtained necessary documentation. Chief Complaint   Patient presents with    Fatty Liver     FS 6month follow up with Bertha May:    22 1026   BP: (!) 162/78   Pulse: 62   Temp: 97.9 °F (36.6 °C)   TempSrc: Temporal   SpO2: 98%   Weight: 189 lb 3.2 oz (85.8 kg)   Height: 5' 4\" (1.626 m)   PainSc:   0 - No pain       Health Maintenance Review: Patient reminded of \"due or due soon\" health maintenance. I have asked the patient to contact his/her primary care provider (PCP) for follow-up on his/her health maintenance. Coordination of Care Questionnaire:  :   1) Have you been to an emergency room, urgent care, or hospitalized since your last visit? If yes, where when, and reason for visit? Yes. Admission at [facility] in 2022 EMILIA       2. Have seen or consulted any other health care provider since your last visit? If yes, where when, and reason for visit? YES    Patient is accompanied by self I have received verbal consent from [facility]. Hood to discuss any/all medical information while they are present in the room.

## 2022-08-16 NOTE — PATIENT INSTRUCTIONS
Learning About the [facility] Diet  What is the Mediterranean diet? The Mediterranean diet is a style of eating rather than a diet plan. It features foods eaten in [place], [place], [place] and [place], and other countries along the [facility]. It emphasizes eating foods like fish, fruits, vegetables, beans, high-fiber breads and whole grains, nuts, and olive oil. This style of eating includes limited red meat, cheese, and sweets. Why choose the Mediterranean diet? A Mediterranean-style diet may improve heart health. It contains more fat than other heart-healthy diets. But the fats are mainly from nuts, unsaturated oils (such as fish oils and olive oil), and certain nut or seed oils (such as canola, soybean, or flaxseed oil). These fats may help protect the heart and blood vessels. How can you get started on the Mediterranean diet? Here are some things you can do to switch to a more Mediterranean way of eating. What to eat  Eat a variety of fruits and vegetables each day, such as grapes, blueberries, tomatoes, broccoli, peppers, figs, olives, spinach, eggplant, beans, lentils, and chickpeas. Eat a variety of whole-grain foods each day, such as oats, brown rice, and whole wheat bread, pasta, and couscous. Eat fish at least 2 times a week. Try tuna, salmon, mackerel, lake trout, herring, or sardines. Eat moderate amounts of low-fat dairy products, such as milk, cheese, or yogurt. Eat moderate amounts of poultry and eggs. Choose healthy (unsaturated) fats, such as nuts, olive oil, and certain nut or seed oils like canola, soybean, and flaxseed. Limit unhealthy (saturated) fats, such as butter, palm oil, and coconut oil. And limit fats found in animal products, such as meat and dairy products made with whole milk. Try to eat red meat only a few times a month in very small amounts. Limit sweets and desserts to only a few times a week. This includes sugar-sweetened drinks like soda.   The Mediterranean diet may also include red wine with your meal--1 glass each day for women and up to 2 glasses a day for men. Tips for eating at home  Use herbs, spices, garlic, lemon zest, and citrus juice instead of salt to add flavor to foods. Add avocado slices to your sandwich instead of tan. Have fish for lunch or dinner instead of red meat. Brush the fish with olive oil, and broil or grill it. Sprinkle your salad with seeds or nuts instead of cheese. Cook with olive or canola oil instead of butter or oils that are high in saturated fat. Switch from 2% milk or whole milk to 1% or fat-free milk. Dip raw vegetables in a vinaigrette dressing or hummus instead of dips made from mayonnaise or sour cream.  Have a piece of fruit for dessert instead of a piece of cake. Try baked apples, or have some dried fruit. Tips for eating out  Try broiled, grilled, baked, or poached fish instead of having it fried or breaded. Ask your  to have your meals prepared with olive oil instead of butter. Order dishes made with marinara sauce or sauces made from olive oil. Avoid sauces made from cream or mayonnaise. Choose whole-grain breads, whole wheat pasta and pizza crust, brown rice, beans, and lentils. Cut back on butter or margarine on bread. Instead, you can dip your bread in a small amount of olive oil. Ask for a side salad or grilled vegetables instead of french fries or chips. Where can you learn more? Go to http://www.robertson.com/  Enter O407 in the search box to learn more about \"Learning About the Mediterranean Diet. \"  Current as of: September 8, 2021               Content Version: 13.2  © 9194-9468 [employer]. Care instructions adapted under license by [employer] (which disclaims liability or warranty for this information).  If you have questions about a medical condition or this instruction, always ask your healthcare professional. [employer] disclaims any warranty or liability for your use of this information.

## 2022-08-16 NOTE — PROGRESS NOTES
[facility] hospitals, MD, [address], [place], [place]      Gardenia Houser, JESSICA Lawton, [facility]-BC     Charlene James, [facility]-BC   Alvaro Neumann, NEALP-BERNARDO Brito, [facility]       Diann Liao [facility] [place] 136    at [address], [address] [place] [address], [facility] 22.    [phone]    FAX: [address] [place] [address]    at [address], [address]    [phone]    FAX: [phone]       Patient Care Team:  Oriana Montoya MD as PCP - General (Family Medicine)  Oriana Montoya MD as PCP - [address] [place] [address] Provider  Barby Olson MD (Gastroenterology)  Yifan Escalona MD (General Surgery)      Problem List  Date Reviewed: 2/9/2022            Codes Class Noted    Sleep apnea ICD-10-CM: G47.30  ICD-9-CM: 780.57  1/26/2019        KELSEY (nonalcoholic steatohepatitis) ICD-10-CM: K75.81  ICD-9-CM: 571.8  1/25/2019        Diabetes mellitus, type 2 ([facility] ICD-10-CM: E11.9  ICD-9-CM: 250.00  1/25/2019        Hypertension ICD-10-CM: I10  ICD-9-CM: 401.9  1/25/2019        Hypothyroidism ICD-10-CM: E03.9  ICD-9-CM: 244.9  1/25/2019        H/O carpal tunnel repair ICD-10-CM: Z98.890  ICD-9-CM: V15.29  1/25/2019        S/P CARIDAD (total abdominal hysterectomy) ICD-10-CM: Z90.710  ICD-9-CM: V88.01  1/25/2019        History of cholecystectomy ICD-10-CM: Z90.49  ICD-9-CM: V45.79  1/25/2019        Kidney stones ICD-10-CM: N20.0  ICD-9-CM: 592.0  1/25/2019        H/O total knee replacement ICD-10-CM: D38.252  ICD-9-CM: V43.65  8/26/2013         Ingrid LUCITA Cecilia Monsalve returns to the [place] [address] for management of non-alcoholic steatohepatitis (KELSEY).  The active problem list, all pertinent past medical history, medications, radiologic findings and laboratory findings related to the liver disorder were reviewed with the patient. The patient is a 81 y.o.  female who was found to have KELSEY, stage 2-3, on liver biopsy in 10/2018. This was following noted elevation of liver enzymes. Serologic evaluation for markers of chronic liver disease was positive for HFE single mutation, otherwise negative. She had MRI performed in 10/2018. Results suggest fatty liver disease. A liver biopsy was performed in 10/2018. This demonstrated KELSEY with stage 2-3 fibrosis. Over the course of the past 2.5  years she has had weight gain of ~12-15#. Prior to this, she had ~30# weight loss. She stopped keto diet due to her elevation in TGs in 3/2021 or so at the suggestion of her PCP. She was then given a trial of Vascepa, which caused abdominal bloating and nausea and she discontinued therapy after a 3 month trial ~5/2021. Liver enzymes have normalized on past assessments with associated weight loss. Unfortunately, weight loss has not been sustained. Fibroscan assessment performed at last office visit showed EkPa was 5.9. Suggested fibrosis level is F1. CAP score is 279, this is consistent with steatosis. We plan on repeating this today. The patient has no symptoms which can be attributed to the liver disorder. The patient has not experienced fatigue, pain in the right side over the liver. The patient completes all daily activities without any functional limitations. She has had difficulty in losing weight. Reports that she has tried everything in the past and now is trying to watch portions and increase physical activity. She has had difficulty with mobility due to knee pains. She was hospitalized in 5/2022 for diverticulitis and 5 days IV Abx. CT scan done, \"all else looked normal.\" Available with Dr Chanel Garrett. ASSESSMENT AND PLAN:  KELSEY  The diagnosis is based upon liver biopsy. A liver biopsy performed in 10/2018 shows stage 2-3 fibrosis.       She has lost a significant amount of weight in the past and had largely normalized the liver enzymes. Her past Fibroscan would suggest that there may have been down-staging of fibrosis as well. Repeat Fibroscan in the office is suggestive of more advanced fibrosis than last assessments and consistent with F3+ changes. As this has shown potential progression and she has other possible related physical findings of spider angiomata, I have suggested that we proceed with liver biopsy to update her fibrosis status. If found to have progression, this could have significant impact on our follow-up care and potential for studies inclusion for KELSEY. I have reviewed the process of liver biopsy in detail with her and she is ready to proceed. This will be scheduled. I have continued to encourage her weight loss goals. She has a long-term goal of ~165#. Liver transaminases are elevated. ALP is normal.  Liver function is normal.  The platelet count is normal.  These values will be repeated today. There is currently no FDA approved medical treatment for fatty liver, NALFD or KELSEY. There is no medication or vitamin supplements that we advocate for KELSEY. Using glitazones in patients without diabetes mellitus has been shown to reduce fat content in the liver but has no effect on fibrosis and is associated with weight gain. Vitamin E has also been used but the data is not very good and most experts no longer advocate this. The only medical treatments for KELSEY are though clinical trials. The patient would like to participate in a clinical trial, will consider her for inclusion in future. I will forward her information onto studies for review following interpretation with Dr Yves Primrose of her liver biopsy. 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 This single mutation can cause an elevation in ferritin but it does not lead to iron overload in the liver or other organs.   A liver biopsy does not demonstrate evidence of iron overload. Treatment of other medical problems in patients with chronic liver disease  There are no contraindications for the patient to take most medications that are necessary for treatment of other medical issues. The patient can take the following medications as these will not impact the patient's current liver disease. Any medications utilized for treatment of DM  Statins to treat hypercholesterolemia or triglycerides, as above. The patient does not comsume alcohol on a daily basis. Normal doses of acetaminophen, as recommended on the label of the bottle, are not hepatotoxic except in the setting of daily alcohol use, even in patients with cirrhosis and can be utilized for pain. Counseling for alcohol in patients with chronic liver disease  The patient was counseled regarding alcohol consumption and the effect of alcohol on chronic liver disease. The patient does not consume any significant amount of alcohol. Vaccinations   The need for vaccination against viral hepatitis A and B will be assessed with serologic and instituted as appropriate. Routine vaccinations against other bacterial and viral agents can be performed as indicated. Annual flu vaccination should be administered if indicated. She has had full COVID vaccination. ALLERGIES  Allergies   Allergen Reactions    Augmentin [Amoxicillin-Pot Clavulanate] Other (comments)     Tachycardia    Codeine Other (comments)     Itch/rash    Morphine Itching    Niaspan [Niacin] Rash    Pcn [Penicillins] Rash and Other (comments)     Nose Bleed    Sulfa Dyne Unknown (comments)     Can't remember reaction    Tetracycline Rash     Facial Rash       MEDICATIONS  Current Outpatient Medications   Medication Sig    amLODIPine (NORVASC) 5 mg tablet     Levothyroxine 100 mcg cap Take 88 mcg by mouth Daily (before breakfast).  Indications: a condition with low thyroid hormone levels    losartan (COZAAR) 100 mg tablet Take 100 mg by mouth daily. Indications: high blood pressure    icosapent ethyL (VASCEPA) 1 gram capsule TAKE 2 CAPSULES BY MOUTH TWICE DAILY WITH MEALS FOR 30 DAYS (Patient not taking: No sig reported)    cephALEXin (KEFLEX) 500 mg capsule Take 500 mg by mouth four (4) times daily. (Patient not taking: No sig reported)    multivitamin (ONE A DAY) tablet Take 1 Tab by mouth daily. (Patient not taking: No sig reported)     No current facility-administered medications for this visit. SYSTEM REVIEW NOT RELATED TO LIVER DISEASE OR REVIEWED ABOVE:  Constitution systems: Negative for fever, chills. Weight stable since last office visit. Eyes: Negative for visual changes. ENT: Negative for sore throat, painful swallowing. Respiratory: Negative for cough, hemoptysis, SOB. Cardiology: Negative for chest pain, palpitations. GI:  Negative for constipation or diarrhea. : Negative for urinary frequency, dysuria, hematuria, nocturia. Skin: Negative for rash. Hematology: Negative for easy bruising, blood clots. Musculo-skeletal: Negative for back pain, muscle pain, weakness. Neurologic: Negative for headaches, dizziness, vertigo, memory problems not related to HE. Psychology: Negative for anxiety, depression. FAMILY HISTORY:  The father  of MI. The mother  of lung cancer. There is no family history of liver disease. SOCIAL HISTORY:  The patient is . The patient has 2 children, and 2 grandchildren. The patient has never used tobacco products. The patient has never consumed significant amounts of alcohol. The patient currently works part time as  - she has had increased hours. PHYSICAL EXAMINATION:  VS: per nursing note  General: No acute distress. Eyes: Sclera anicteric. ENT: No oral lesions. Thyroid normal.  Nodes: No adenopathy. Skin: No spider angiomata. No jaundice. No palmar erythema.   Respiratory: Lungs clear to auscultation. Cardiovascular: Regular heart rate. No murmurs. No JVD. Abdomen: Soft non-tender, liver size normal to percussion/palpation. Spleen not palpable. No obvious ascites. Extremities: No edema. No muscle wasting. No gross arthritic changes. Neurologic: Alert and oriented. Cranial nerves grossly intact. No asterixis. LABORATORY STUDIES:  Liver [place] of [address] Units 2/9/2022 8/10/2021 8/10/2021   WBC 3.6 - 11.0 K/uL 8.8  9.8   HGB 11.5 - 16.0 g/dL 15.2  14.8    - 400 K/uL 399  379   INR 0.9 - 1.2   1.0   AST 15 - 37 U/L 24 27 20   ALT 12 - 78 U/L 31 21 18   Alk Phos 45 - 117 U/L 97  93   Bili, Total 0.2 - 1.0 MG/DL 0.6 0.3 0.6   Bili, Direct 0.0 - 0.2 MG/DL 0.2  0.18   Albumin 3.5 - 5.0 g/dL 4.2  4.8   BUN 6 - 20 MG/DL 9  10   Creat 0.55 - 1.02 MG/DL 0.86  0.77   Na 136 - 145 mmol/L 137  144   K 3.5 - 5.1 mmol/L 4.9  4.6   Cl 97 - 108 mmol/L 104  102   CO2 21 - 32 mmol/L 27  25   Glucose 65 - 100 mg/dL 112 (H) 107 (H) 108 (H)   Additional lab values drawn at today's office visit are pending at the time of documentation. SEROLOGIES:  10/2018. Ferritin 460, HFE genetic testing sinlge mutation C282Y, HANNAH negative, ASMA negative. LIVER HISTOLOGY:  10/2018. Liver biopsy from [address] [place] [address].  KELSEY with stage 2-3 fibrosis. 9/2019. FibroScan performed at [address]. EkPa was 7.2. Suggested fibrosis level is F1-2. CAP score is 380, this is consistent with steatosis. 8/2021. FibroScan performed at [address]. EkPa was 5.9. Suggested fibrosis level is F1. CAP score is 279, this is consistent with steatosis. 8/2022. FibroScan performed at [address]. EkPa was 11.5. Suggested fibrosis level is F3-4. CAP score 330, this is consistent with steatosis. ENDOSCOPIC PROCEDURES:  9/2018. EGD by Dr Js Madera. Normal.    RADIOLOGY:  10/2018. MRI of the liver. Changes consistent with fatty liver.   No liver mass lesions. Normal spleen. No ascites. OTHER TESTING:  Not available or performed    FOLLOW-UP:  All of the issues listed above in the Assessment and Plan were discussed with the patient. All questions were answered. The patient expressed a clear understanding of the above. [address] [place] [address] within 2 weeks of liver biopsy with Dr Yara Lees for interpretation of results and recommendations.      JESSICA Fernandez 13  [address] [facility] [address] [address] [place], [address] [place] [address] 22.  [phone]  [address] [place] [address]

## 2022-08-17 LAB
AFP L3 MFR SERPL: NORMAL % (ref 0–9.9)
AFP SERPL-MCNC: 3.4 NG/ML (ref 0–9.2)

## 2022-08-17 NOTE — PROGRESS NOTES
Pt notified of stable findings, mild relative rise in liver enzymes. Will plan to proceed with liver biopsy as scheduled.

## 2022-08-18 ENCOUNTER — TELEPHONE (OUTPATIENT)
Dept: HEMATOLOGY | Age: 72
End: 2022-08-18

## 2022-09-14 ENCOUNTER — TRANSCRIBE ORDER (OUTPATIENT)
Dept: SCHEDULING | Age: 72
End: 2022-09-14

## 2022-09-14 DIAGNOSIS — K75.81 NONALCOHOLIC STEATOHEPATITIS: Primary | ICD-10-CM

## 2022-10-20 ENCOUNTER — HOSPITAL ENCOUNTER (OUTPATIENT)
Age: 72
Setting detail: OUTPATIENT SURGERY
Discharge: HOME | End: 2022-10-20
Attending: INTERNAL MEDICINE | Admitting: INTERNAL MEDICINE
Payer: MEDICARE

## 2022-10-20 ENCOUNTER — APPOINTMENT (OUTPATIENT)
Dept: ULTRASOUND IMAGING | Age: 72
End: 2022-10-20
Attending: INTERNAL MEDICINE
Payer: MEDICARE

## 2022-10-20 VITALS
WEIGHT: 184 LBS | BODY MASS INDEX: 31.41 KG/M2 | RESPIRATION RATE: 13 BRPM | TEMPERATURE: 99.1 F | OXYGEN SATURATION: 96 % | SYSTOLIC BLOOD PRESSURE: 133 MMHG | HEART RATE: 72 BPM | HEIGHT: 64 IN | DIASTOLIC BLOOD PRESSURE: 61 MMHG

## 2022-10-20 DIAGNOSIS — K75.81 NONALCOHOLIC STEATOHEPATITIS: ICD-10-CM

## 2022-10-20 DIAGNOSIS — K76.0 NAFLD (NONALCOHOLIC FATTY LIVER DISEASE): Primary | ICD-10-CM

## 2022-10-20 PROCEDURE — 76942 ECHO GUIDE FOR BIOPSY: CPT

## 2022-10-20 PROCEDURE — 74011000250 HC RX REV CODE- 250: Performed by: INTERNAL MEDICINE

## 2022-10-20 PROCEDURE — 77030014115: Performed by: INTERNAL MEDICINE

## 2022-10-20 PROCEDURE — 88307 TISSUE EXAM BY PATHOLOGIST: CPT

## 2022-10-20 PROCEDURE — 77030013826 HC NDL BIOP MAXCOR BARD -B: Performed by: INTERNAL MEDICINE

## 2022-10-20 PROCEDURE — 76040000019: Performed by: INTERNAL MEDICINE

## 2022-10-20 PROCEDURE — 88313 SPECIAL STAINS GROUP 2: CPT

## 2022-10-20 PROCEDURE — 47000 NEEDLE BIOPSY OF LIVER PERQ: CPT | Performed by: INTERNAL MEDICINE

## 2022-10-20 PROCEDURE — 76942 ECHO GUIDE FOR BIOPSY: CPT | Performed by: INTERNAL MEDICINE

## 2022-10-20 RX ORDER — SODIUM CHLORIDE 0.9 % (FLUSH) 0.9 %
5-40 SYRINGE (ML) INJECTION EVERY 8 HOURS
Status: DISCONTINUED | OUTPATIENT
Start: 2022-10-20 | End: 2022-10-20 | Stop reason: HOSPADM

## 2022-10-20 RX ORDER — SODIUM CHLORIDE 0.9 % (FLUSH) 0.9 %
5-40 SYRINGE (ML) INJECTION AS NEEDED
Status: DISCONTINUED | OUTPATIENT
Start: 2022-10-20 | End: 2022-10-20 | Stop reason: HOSPADM

## 2022-10-20 RX ORDER — LIDOCAINE HYDROCHLORIDE 10 MG/ML
10 INJECTION INFILTRATION; PERINEURAL ONCE
Status: COMPLETED | OUTPATIENT
Start: 2022-10-20 | End: 2022-10-20

## 2022-10-20 RX ORDER — FENTANYL CITRATE 50 UG/ML
25 INJECTION, SOLUTION INTRAMUSCULAR; INTRAVENOUS
Status: DISCONTINUED | OUTPATIENT
Start: 2022-10-20 | End: 2022-10-20 | Stop reason: HOSPADM

## 2022-10-20 RX ORDER — ONDANSETRON 2 MG/ML
4 INJECTION INTRAMUSCULAR; INTRAVENOUS
Status: DISCONTINUED | OUTPATIENT
Start: 2022-10-20 | End: 2022-10-20 | Stop reason: HOSPADM

## 2022-10-20 RX ORDER — LIDOCAINE HYDROCHLORIDE 10 MG/ML
INJECTION INFILTRATION; PERINEURAL
Status: DISCONTINUED
Start: 2022-10-20 | End: 2022-10-20 | Stop reason: HOSPADM

## 2022-10-20 NOTE — H&P
[address] [facility], MD, FACP, [place], [place]      JESSICA Kurtz, [facility]-BC   Fredo Ravi [facility]   Royce Pink, FNP-C   José Luis Grady FNP-C    Giovanna Diaz, [facility]       Diann Liao [facility] 136    at [address], [address] Denis Pierce  22.    [phone]    FAX: [address] [place] [address], [address], [address]    [phone]    FAX: [phone]         PRE-PROCEDURE NOTE - LIVER BIOPSY    H and P from last office visit reviewed. Allergies reviewed. Out-patient medication list reviewed. Patient Active Problem List   Diagnosis Code    H/O total knee replacement Z96.659    KELSEY (nonalcoholic steatohepatitis) K75.81    Diabetes mellitus, type 2 ([facility] E11.9    Hypertension I10    Hypothyroidism E03.9    H/O carpal tunnel repair Z98.890    S/P CARIDAD (total abdominal hysterectomy) Z90.710    History of cholecystectomy Z90.49    Kidney stones N20.0    Sleep apnea G47.30       Allergies   Allergen Reactions    Augmentin [Amoxicillin-Pot Clavulanate] Other (comments)     Tachycardia    Codeine Other (comments)     Itch/rash    Morphine Itching    Niaspan [Niacin] Rash    Pcn [Penicillins] Rash and Other (comments)     Nose Bleed    Sulfa Dyne Unknown (comments)     Can't remember reaction    Tetracycline Rash     Facial Rash       No current facility-administered medications on file prior to encounter. Current Outpatient Medications on File Prior to Encounter   Medication Sig Dispense Refill    OTHER Artifical tears as needed. One vial both eyes as needed. amLODIPine (NORVASC) 5 mg tablet Take 5 mg by mouth daily. Levothyroxine 100 mcg cap Take 100 mcg by mouth Daily (before breakfast).     Indications: a condition with low thyroid hormone levels      losartan (COZAAR) 100 mg tablet Take 100 mg by mouth daily. Indications: high blood pressure         For liver biopsy to assess NALFD. The risks of the procedure were discussed with the patient. This included bleeding, pain, and puncture of other organs. All questions were answered. The patient wishes to proceed with the procedure. The patient was counseled at length about the risks of melissa Covid-19 in the aren-operative and post-operative states including the recovery window of their procedure. The patient was made aware that melissa Covid-19 after a surgical procedure may worsen their prognosis for recovering from the virus and lend to a higher morbidity and or mortality risk. The patient was given the options of postponing their procedure. All of the risks, benefits, and alternatives were discussed. The patient does  wish to proceed with the procedure. PHYSICAL EXAMINATION:  Visit Vitals  BP (!) 161/83   Pulse 72   Temp 99.1 °F (37.3 °C)   Resp 16   Ht 5' 4\" (1.626 m)   Wt 184 lb (83.5 kg)   SpO2 97%   Breastfeeding No   BMI 31.58 kg/m²       General: No acute distress. Eyes: Sclera anicteric. ENT: No oral lesions. Thyroid normal.  Nodes: No adenopathy. Skin: No spider angiomata. No jaundice. No palmar erythema. Respiratory: Lungs clear to auscultation. Cardiovascular: Regular heart rate. No murmurs. No JVD. Abdomen: Soft non-tender, liver size normal to percussion/palpation. Spleen not palpable. No obvious ascites. Extremities: No edema. No muscle wasting. No gross arthritic changes. Neurologic: Alert and oriented. Cranial nerves grossly intact. No asterixis.       LABS:  Lab Results   Component Value Date/Time    WBC 8.5 08/16/2022 11:34 AM    HGB 15.7 08/16/2022 11:34 AM    HCT 46.9 08/16/2022 11:34 AM    PLATELET 368 63/70/4663 11:34 AM    MCV 91.1 08/16/2022 11:34 AM     Lab Results   Component Value Date/Time INR 1.0 08/16/2022 11:34 AM    INR 1.0 08/10/2021 12:54 PM    INR 1.3 (H) 08/28/2013 05:02 AM    INR (POC) 1.8 (H) 08/29/2013 05:12 AM    Prothrombin time 10.8 08/16/2022 11:34 AM    Prothrombin time 10.4 08/10/2021 12:54 PM    Prothrombin time 14.0 (H) 08/28/2013 05:02 AM       ASSESSMENT AND PLAN:  Liver biopsy under ultrasound guidance.     Ayaka Zambrano MD  [address] Lorraine Davis [address] [place] [address], [facility]  22. [phone]  FAX:  [address] [place] [address]

## 2022-10-20 NOTE — PROCEDURES
[address] [facility], MD, Crystal, Jimena Wilson Fraire, Riaz Loo Roxanna, JESSICA James, [facility]   Ida Batres, [facility]   Lesa Sterling, FNP-BERNARDO [facility]    Maurizio Oglesby [facility]-BC       Diann Liao [facility] 136    at [address], [facility] Denis Pierce  22.    [phone]    FAX: [address] [place] [address], [address]    [phone]    FAX: [phone]         LIVER BIOPSY PROCEDURE NOTE    Ingrid Hood  1950    INDICATIONS/PRE-OPERATIVE  DIAGNOSIS:  NAFLD  Fatty liver alcohol versus non-alcoholic  Elevated liver enzymes  Elevated liver ALP  Autoimmune hepatitis  PBC  PSC  Chronic HCV       Chronic HBV       Hemochromatosis  Evaluation of elevated liver chemistries in liver transplant recipient    : Radhames Trejo MD    SURGICAL ASSISTANT:  None    PROSTHETIC DEVICES, TISSUE GRAFTS, TRANSPLANTED ORGANS:  Not applicable    SEDATION: 1% Lidocaine injection 10 ml    PROCEDURE:  Informed consent to perform the procedure was obtained from the patient. The patient was positioned on the edge of the stretcher lying flat in the supine position. Ultrasound was utilized to image the liver. The diaphragm and any major mass lesion or vascular structures within the liver were identified. An appropriate site for liver biopsy was identified. The distance from the surface of the skin to the liver capsule was 4 cm. This area was prepped with betadine and draped in sterile fashion. The skin was infiltrated with 1% lidocaine. The deeper subcutanous tissues and liver capsule overlying the biopsy site were then infiltrated with 1% lidocaine until appropriate anesthesia was obtained.   A small incision was made in the skin so the biopsy devise could be easily inserted. A total of 2 passes with the 16 gauge Bard biopsy devise was then made into the liver. Core(s) of liver tissue totaling 4 cm in length were obtained and placed into tissue fixative. A band aid was placed over the biopsy site. The patient was then repositioned on the right side and transported to the recovery area on the stretcher for routine monitoring until discharge. The specimen was sent to pathology for processing via the normal transport mechanism. SPECIMEN COLLECTED: Liver    INTERVENTIONS:  None    ESTIMATED BLOOD LOSS: Negligible.      COMPLICATIONS:  None    POST-OPERATIVE DIAGNOSIS: Same as Pre-operative Diagnosis      MD Edilma [address] of [address] [facility] 38.  Lorraine Martinez 7  [place]  22.  [phone]  FAX:  [address] [place]

## 2022-10-20 NOTE — DISCHARGE INSTRUCTIONS
[address] [facility], Lacy BEDOAY, Jimena Wilson [place], [place]      JESSICA cMfadden, [facility]-BC   Lesa Diaz, [facility]   Maria Dolores Henry, GIOVANI Rodriguez FNP-BERNARDO Conn, [facility]       Diann Liao [facility] [address]    at [address], [facility] Denis Pierce  22.    [phone]    FAX: [address] [place] [address] Avenue    [address], [address], [address]    [phone]    FAX: [phone]         LIVER BIOPSY [address] [facility]  1950  Date: 10/20/2022    DIET:    You may resume your previous diet. ACTIVITIES:  Rest quietly the rest of today. You should not lift any objects more than 20 pounds for the next 2 days. If you work sitting down without strenuous activity you may return to work tomorrow. If you exert yourself or do heavy lifting at work you should take tomorrow off. Do not drive or operate hazardous machinery for 12 hours after you are discharged from this procedure. SPECIAL INSTRUCTIONS:  Do not use any aspirin or non-steroidal (Motin, Advil, Naproxen, etc) pain medications for the next 2 days. You may use extra-strength Tylenol (acetaminophen) if you experience pain or discomfort later today. Restarting blood thinners: If you were taking blood thinners prior to the procedure you can restart these in 2 days. Call the [address] [place] [address] office if you experience any of the following:  Persistent or severe abdominal pain. Persistent or severe abdominal distention. Fever and chills   Nausea and vomiting. New or unusual symptoms. Follow-up care: You should have a follow up appointment with Dr. Samanta Myers to review the results of the liver biopsy results in 2 weeks.   If you do not have an appointment please call the office at the number listed above to schedule this. Other instructions: If you have any problems or questions call the [facility] office at the phone number listed above. DISCHARGE SUMMARY from Nurse: The following personal items collected during your admission are returned to you:   Dental Appliance: Dental Appliances: None  Vision: Visual Aid: None  Hearing Aid:    Jewelry:    Clothing:    Other Valuables:    Valuables sent to safe:            Learning About Coronavirus (COVID-19)  Coronavirus (COVID-19): Overview  What is coronavirus (UOVYL-88)? The coronavirus disease (COVID-19) is caused by a virus. It is an illness that was first found in [place], [place], in December 2019. It has since spread worldwide. The virus can cause fever, cough, and trouble breathing. In severe cases, it can cause pneumonia and make it hard to breathe without help. It can cause death. Coronaviruses are a large group of viruses. They cause the common cold. They also cause more serious illnesses like Middle East respiratory syndrome (MERS) and severe acute respiratory syndrome (SARS). COVID-19 is caused by a novel coronavirus. That means it's a new type that has not been seen in people before. This virus spreads person-to-person through droplets from coughing and sneezing. It can also spread when you are close to someone who is infected. And it can spread when you touch something that has the virus on it, such as a doorknob or a tabletop. What can you do to protect yourself from coronavirus (COVID-19)? The best way to protect yourself from getting sick is to: Avoid areas where there is an outbreak. Avoid contact with people who may be infected. Wash your hands often with soap or alcohol-based hand sanitizers. Avoid crowds and try to stay at least 6 feet away from other people. Wash your hands often, especially after you cough or sneeze.  Use soap and water, and scrub for at least 20 seconds. If soap and water aren't available, use an alcohol-based hand . Avoid touching your mouth, nose, and eyes. What can you do to avoid spreading the virus to others? To help avoid spreading the virus to others:  Cover your mouth with a tissue when you cough or sneeze. Then throw the tissue in the trash. Use a disinfectant to clean things that you touch often. Stay home if you are sick or have been exposed to the virus. Don't go to school, work, or public areas. And don't use public transportation. If you are sick:  Leave your home only if you need to get medical care. But call the doctor's office first so they know you're coming. And wear a face mask, if you have one. If you have a face mask, wear it whenever you're around other people. It can help stop the spread of the virus when you cough or sneeze. Clean and disinfect your home every day. Use household  and disinfectant wipes or sprays. Take special care to clean things that you grab with your hands. These include doorknobs, remote controls, phones, and handles on your refrigerator and microwave. And don't forget countertops, tabletops, bathrooms, and computer keyboards. When to call for help  Call 911 anytime you think you may need emergency care. For example, call if:  You have severe trouble breathing. (You can't talk at all.)  You have constant chest pain or pressure. You are severely dizzy or lightheaded. You are confused or can't think clearly. Your face and lips have a blue color. You pass out (lose consciousness) or are very hard to wake up. Call your doctor now if you develop symptoms such as:  Shortness of breath. Fever. Cough. If you need to get care, call ahead to the doctor's office for instructions before you go. Make sure you wear a face mask, if you have one, to prevent exposing other people to the virus. Where can you get the latest information?   The following health organizations are tracking and studying this virus. Their websites contain the most up-to-date information. Maxinemyra Lose also learn what to do if you think you may have been exposed to the virus. U.S. Centers for Disease Control and Prevention (CDC): The CDC provides updated news about the disease and travel advice. The website also tells you how to prevent the spread of infection. www.cdc.gov  World Health Organization [facility]): WHO offers information about the virus outbreaks. WHO also has travel advice. www.who.int  Current as of: April 1, 2020               Content Version: 12.4  © 0368-7675 Healthwise, Incorporated. Care instructions adapted under license by your healthcare professional. If you have questions about a medical condition or this instruction, always ask your healthcare professional. [address] any warranty or liability for your use of this information.

## 2022-11-11 ENCOUNTER — OFFICE VISIT (OUTPATIENT)
Dept: HEMATOLOGY | Age: 72
End: 2022-11-11
Payer: MEDICARE

## 2022-11-11 VITALS
HEART RATE: 72 BPM | SYSTOLIC BLOOD PRESSURE: 138 MMHG | OXYGEN SATURATION: 98 % | HEIGHT: 64 IN | WEIGHT: 185 LBS | TEMPERATURE: 96.8 F | RESPIRATION RATE: 17 BRPM | BODY MASS INDEX: 31.58 KG/M2 | DIASTOLIC BLOOD PRESSURE: 70 MMHG

## 2022-11-11 DIAGNOSIS — K75.81 NASH (NONALCOHOLIC STEATOHEPATITIS): Primary | ICD-10-CM

## 2022-11-11 PROCEDURE — G8754 DIAS BP LESS 90: HCPCS | Performed by: INTERNAL MEDICINE

## 2022-11-11 PROCEDURE — 3078F DIAST BP <80 MM HG: CPT | Performed by: INTERNAL MEDICINE

## 2022-11-11 PROCEDURE — G8510 SCR DEP NEG, NO PLAN REQD: HCPCS | Performed by: INTERNAL MEDICINE

## 2022-11-11 PROCEDURE — 3017F COLORECTAL CA SCREEN DOC REV: CPT | Performed by: INTERNAL MEDICINE

## 2022-11-11 PROCEDURE — G8427 DOCREV CUR MEDS BY ELIG CLIN: HCPCS | Performed by: INTERNAL MEDICINE

## 2022-11-11 PROCEDURE — 1101F PT FALLS ASSESS-DOCD LE1/YR: CPT | Performed by: INTERNAL MEDICINE

## 2022-11-11 PROCEDURE — G8752 SYS BP LESS 140: HCPCS | Performed by: INTERNAL MEDICINE

## 2022-11-11 PROCEDURE — G8417 CALC BMI ABV UP PARAM F/U: HCPCS | Performed by: INTERNAL MEDICINE

## 2022-11-11 PROCEDURE — G8400 PT W/DXA NO RESULTS DOC: HCPCS | Performed by: INTERNAL MEDICINE

## 2022-11-11 PROCEDURE — 1090F PRES/ABSN URINE INCON ASSESS: CPT | Performed by: INTERNAL MEDICINE

## 2022-11-11 PROCEDURE — G0463 HOSPITAL OUTPT CLINIC VISIT: HCPCS | Performed by: INTERNAL MEDICINE

## 2022-11-11 PROCEDURE — G8536 NO DOC ELDER MAL SCRN: HCPCS | Performed by: INTERNAL MEDICINE

## 2022-11-11 PROCEDURE — 99214 OFFICE O/P EST MOD 30 MIN: CPT | Performed by: INTERNAL MEDICINE

## 2022-11-11 PROCEDURE — 3074F SYST BP LT 130 MM HG: CPT | Performed by: INTERNAL MEDICINE

## 2022-11-11 PROCEDURE — 1123F ACP DISCUSS/DSCN MKR DOCD: CPT | Performed by: INTERNAL MEDICINE

## 2022-11-11 NOTE — Clinical Note
11/29/2022    Patient: Trisha Crowell   YOB: 1950   Date of Visit: 11/11/2022     Shiraz Hong MD  [address] [address]  Via Fax: [phone]    Dear Shiraz Hong MD,      Thank you for referring Ms. Kerri Gunn to [address] for evaluation. My notes for this consultation are attached. If you have questions, please do not hesitate to call me. I look forward to following your patient along with you.       Sincerely,    Elaine Murphy MD

## 2022-11-11 NOTE — PROGRESS NOTES
Identified pt with two pt identifiers(name and ). Reviewed record in preparation for visit and have obtained necessary documentation. Chief Complaint   Patient presents with    Fatty Liver     2 week LBX follow up      Vitals:    22 1013   BP: 138/70   Pulse: 72   Resp: 17   Temp: 96.8 °F (36 °C)   TempSrc: Temporal   SpO2: 98%   Weight: 185 lb (83.9 kg)   Height: 5' 4\" (1.626 m)   PainSc:   0 - No pain       Health Maintenance Review: Patient reminded of \"due or due soon\" health maintenance. I have asked the patient to contact his/her primary care provider (PCP) for follow-up on his/her health maintenance. Coordination of Care Questionnaire:  :   1) Have you been to an emergency room, urgent care, or hospitalized since your last visit? If yes, where when, and reason for visit? no       2. Have seen or consulted any other health care provider since your last visit? If yes, where when, and reason for visit? NO      Patient is accompanied by self I have received verbal consent from [address] [place] [address]. Erwin to discuss any/all medical information while they are present in the room.

## 2022-11-29 NOTE — PROGRESS NOTES
[address] [place] [address], MD, Gil Mckeon, [facility], [place]      Courtney Flicker, JESSICA James, [facility]-BC   Everardo Macario, [facility]   Ирина Davis, FNP-C  Brannon Joe, FNP-C   Gean Cheadle, [facility]      [address]   at [address] [place] [address], [address] [place] [address] Denis Vazquez  22.   [phone]   FAX: [address]   at [address], [address]   [phone]   FAX: [phone]       Patient Care Team:  Brent Reeves MD as PCP - General (Family Medicine)  Brent Reeves MD as PCP - [facility]  Leighann Parks MD (Gastroenterology)  Amaury Mendoza MD (General Surgery)      Problem List  Date Reviewed: 11/29/2022            Codes Class Noted    Sleep apnea ICD-10-CM: G47.30  ICD-9-CM: 780.57  1/26/2019        KELSEY (nonalcoholic steatohepatitis) ICD-10-CM: K75.81  ICD-9-CM: 571.8  1/25/2019        Diabetes mellitus, type 2 ([facility] ICD-10-CM: E11.9  ICD-9-CM: 250.00  1/25/2019        Hypertension ICD-10-CM: I10  ICD-9-CM: 401.9  1/25/2019        Hypothyroidism ICD-10-CM: E03.9  ICD-9-CM: 244.9  1/25/2019        H/O carpal tunnel repair ICD-10-CM: Z98.890  ICD-9-CM: V15.29  1/25/2019        S/P CARIDAD (total abdominal hysterectomy) ICD-10-CM: Z90.710  ICD-9-CM: V88.01  1/25/2019        History of cholecystectomy ICD-10-CM: Z90.49  ICD-9-CM: V45.79  1/25/2019        Kidney stones ICD-10-CM: N20.0  ICD-9-CM: 592.0  1/25/2019        H/O total knee replacement ICD-10-CM: Y60.355  ICD-9-CM: V43.65  8/26/2013           Ingrid Pelletier returns to the [place] [address] for management of non-alcoholic steatohepatitis (KELSEY).  The active problem list, all pertinent past medical history, medications, radiologic findings and laboratory findings related to the liver disorder were reviewed with the patient. The patient is a 81 y.o.  female who was found elevated liver enzymes in 2018 and KELSEY on liver biopsy in 10/2018. Serologic evaluation for markers of chronic liver disease was positive for HFE single mutation    MRI performed in 10/2018 suggested fatty liver disease. The most recent Fibroscan in 8/2022 was 11.5 with  suggesting fatty liver and stage 3 fibrosis. The patient underwent a liver biopsy in 10/2022. The procedure was well tolerated. I have personally reviewed and interpreted the liver biopsy slides. This demonstrates mild KELSEY with stage 3 fibrosis. She has had tried to loose weight without sustained success sine 2018. She says she has tried everything. Portion control, increase physical activity, keto diet was stopped due to her elevation in TGs. She was then given a trial of Vascepa, which caused abdominal bloating, nausea and was stopped. The patient has no symptoms which can be attributed to the liver disorder. The patient has not experienced fatigue,   pain in the right side over the liver. The patient completes all daily activities without any functional limitations. ASSESSMENT AND PLAN:  KELSEY  The diagnosis is based upon liver biopsy. A liver biopsy in 10/2018 shows KLESEY stage 2-3 fibrosis. A liver biopsy in 10/2022 shows mild KELSEY with stage 3 fibrosis. Fibroscan in 9/2019 was 7.2 EkPa, Cap 380 suggesting fatty liver and stage 1 fibrosis   Fibroscan in 8/2021 was 5.9 EkPa, Cap 279 suggesting fatty liver and stage 0-1 fibrosis   Fibroscan in 9/2019 was 11.5 EkPa, Cap 330 suggesting fatty liver and stage 3 fibrosis     There is currently no FDA approved medical treatment for fatty liver, NALFD or KELSEY. The only medical treatments for KELSEY are though clinical trials.     The patient would like to participate in a clinical trial.    Counseling for diet and weight loss in patients with confirmed or suspected NAFLD  The patient was counseled regarding diet and exercise to achieve weight loss. The best diet for patients with fatty liver is one very low in carbohydrates and enriched with protein such as an Maricarmen's program.      The patient was told not to consume any food products and drinks containing fructose as this enhances hepatic fat synthesis. There is no medication or vitamin supplements that we advocate for KELSEY. Using glitazones in patients without diabetes mellitus has been shown to reduce fat content in the liver but has no effect on fibrosis and is associated with weight gain. Vitamin E has also been used but the data is not very good and most experts no longer advocate this. 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 This single mutation can cause an elevation in ferritin but it does not lead to iron overload in the liver or other organs. A liver biopsy does not demonstrate evidence of iron overload. Treatment of other medical problems in patients with chronic liver disease  There are no contraindications for the patient to take most medications that are necessary for treatment of other medical issues. The patient can take the following medications as these will not impact the patient's current liver disease. Any medications utilized for treatment of DM  Statins to treat hypercholesterolemia or triglycerides, as above. The patient does not comsume alcohol on a daily basis. Normal doses of acetaminophen, as recommended on the label of the bottle, are not hepatotoxic except in the setting of daily alcohol use, even in patients with cirrhosis and can be utilized for pain. Counseling for alcohol in patients with chronic liver disease  The patient was counseled regarding alcohol consumption and the effect of alcohol on chronic liver disease. The patient does not consume any significant amount of alcohol.     Vaccinations   The need for vaccination against viral hepatitis A and B will be assessed with serologic and instituted as appropriate. Routine vaccinations against other bacterial and viral agents can be performed as indicated. Annual flu vaccination should be administered if indicated. She has had full COVID vaccination. ALLERGIES  Allergies   Allergen Reactions    Augmentin [Amoxicillin-Pot Clavulanate] Other (comments)     Tachycardia    Codeine Other (comments)     Itch/rash    Morphine Itching    Niaspan [Niacin] Rash    Pcn [Penicillins] Rash and Other (comments)     Nose Bleed    Sulfa Dyne Unknown (comments)     Can't remember reaction    Tetracycline Rash     Facial Rash       MEDICATIONS  Current Outpatient Medications   Medication Sig    OTHER Artifical tears as needed. One vial both eyes as needed. amLODIPine (NORVASC) 5 mg tablet Take 5 mg by mouth daily. Levothyroxine 100 mcg cap Take 100 mcg by mouth Daily (before breakfast). Indications: a condition with low thyroid hormone levels    losartan (COZAAR) 100 mg tablet Take 100 mg by mouth daily. Indications: high blood pressure     No current facility-administered medications for this visit. SYSTEM REVIEW NOT RELATED TO LIVER DISEASE OR REVIEWED ABOVE:  Constitution systems: Negative for fever, chills. Weight stable since last office visit. Eyes: Negative for visual changes. ENT: Negative for sore throat, painful swallowing. Respiratory: Negative for cough, hemoptysis, SOB. Cardiology: Negative for chest pain, palpitations. GI:  Negative for constipation or diarrhea. : Negative for urinary frequency, dysuria, hematuria, nocturia. Skin: Negative for rash. Hematology: Negative for easy bruising, blood clots. Musculo-skeletal: Negative for back pain, muscle pain, weakness. Neurologic: Negative for headaches, dizziness, vertigo, memory problems not related to HE. Psychology: Negative for anxiety, depression.      FAMILY HISTORY:  The father  of MI.    The mother  of lung cancer. There is no family history of liver disease. SOCIAL HISTORY:  The patient is . The patient has 2 children, and 2 grandchildren. The patient has never used tobacco products. The patient has never consumed significant amounts of alcohol. The patient currently works part time as  - she has had increased hours. PHYSICAL EXAMINATION:  VS: per nursing note  General: No acute distress. Eyes: Sclera anicteric. ENT: No oral lesions. Thyroid normal.  Nodes: No adenopathy. Skin: No spider angiomata. No jaundice. No palmar erythema. Respiratory: Lungs clear to auscultation. Cardiovascular: Regular heart rate. No murmurs. No JVD. Abdomen: Soft non-tender, liver size normal to percussion/palpation. Spleen not palpable. No obvious ascites. Extremities: No edema. No muscle wasting. No gross arthritic changes. Neurologic: Alert and oriented. Cranial nerves grossly intact. No asterixis. LABORATORY STUDIES:  Liver [place] of [address] [place] [address] 2022 8/10/2021 8/10/2021   WBC 3.6 - 11.0 K/uL 8.8  9.8   HGB 11.5 - 16.0 g/dL 15.2  14.8    - 400 K/uL 399  379   INR 0.9 - 1.2   1.0   AST 15 - 37 U/L 24 27 20   ALT 12 - 78 U/L 31 21 18   Alk Phos 45 - 117 U/L 97  93   Bili, Total 0.2 - 1.0 MG/DL 0.6 0.3 0.6   Bili, Direct 0.0 - 0.2 MG/DL 0.2  0.18   Albumin 3.5 - 5.0 g/dL 4.2  4.8   BUN 6 - 20 MG/DL 9  10   Creat 0.55 - 1.02 MG/DL 0.86  0.77   Na 136 - 145 mmol/L 137  144   K 3.5 - 5.1 mmol/L 4.9  4.6   Cl 97 - 108 mmol/L 104  102   CO2 21 - 32 mmol/L 27  25   Glucose 65 - 100 mg/dL 112 (H) 107 (H) 108 (H)   Additional lab values drawn at today's office visit are pending at the time of documentation. SEROLOGIES:  10/2018. Ferritin 460, HFE genetic testing sinlge mutation C282Y, HANNAH negative, ASMA negative. LIVER HISTOLOGY:  10/2018.   Liver biopsy from [address] [place] [address].  KELSEY with stage 2-3 fibrosis. 9/2019. FibroScan performed at [facility]. EkPa was 7.2. Suggested fibrosis level is F1-2. CAP score is 380, this is consistent with steatosis. 8/2021. FibroScan performed at [facility]. EkPa was 5.9. Suggested fibrosis level is F1. CAP score is 279, this is consistent with steatosis. 8/2022. FibroScan performed at [facility]. EkPa was 11.5. Suggested fibrosis level is F3-4. CAP score 330, this is consistent with steatosis. 10/2022. Slides reviewed by MLS. KELSEY. 25-33% macrovesicular and micovesicular steatosis, mild inflammation, mild ballooning, Stage 3 fibrosis. JESIKA (111). ENDOSCOPIC PROCEDURES:  9/2018. EGD by Dr Cleve Da Silva. Normal.    RADIOLOGY:  10/2018. MRI of the liver. Changes consistent with fatty liver. No liver mass lesions. Normal spleen. No ascites. OTHER TESTING:  Not available or performed    FOLLOW-UP:  All of the issues listed above in the Assessment and Plan were discussed with the patient. All questions were answered. The patient expressed a clear understanding of the above. Follow-up [address] in for screening and enrollment into a clinical trial for treatment of KELSEY.   The patient was given a follow-up appointment for 6 months in case she decides not to enter or is excluded from entering the clinical trial.      Samir Miguel MD  [address]  [address] [place] [address], [address]  22.  [phone]  FAX:  [address] [place]

## 2023-03-31 ENCOUNTER — TRANSCRIBE ORDER (OUTPATIENT)
Dept: SCHEDULING | Age: 73
End: 2023-03-31

## 2023-03-31 DIAGNOSIS — M54.16 LUMBAR RADICULITIS: Primary | ICD-10-CM

## 2023-04-03 ENCOUNTER — HOSPITAL ENCOUNTER (OUTPATIENT)
Dept: MRI IMAGING | Age: 73
End: 2023-04-03
Attending: ORTHOPAEDIC SURGERY
Payer: MEDICARE

## 2023-04-03 DIAGNOSIS — M54.16 LUMBAR RADICULITIS: ICD-10-CM

## 2023-04-03 PROCEDURE — 72148 MRI LUMBAR SPINE W/O DYE: CPT

## 2023-04-26 ENCOUNTER — OFFICE VISIT (OUTPATIENT)
Dept: HEMATOLOGY | Age: 73
End: 2023-04-26
Payer: MEDICARE

## 2023-04-26 VITALS
SYSTOLIC BLOOD PRESSURE: 132 MMHG | TEMPERATURE: 96.9 F | OXYGEN SATURATION: 97 % | HEART RATE: 83 BPM | DIASTOLIC BLOOD PRESSURE: 73 MMHG | BODY MASS INDEX: 31.1 KG/M2 | WEIGHT: 182.2 LBS | HEIGHT: 64 IN

## 2023-04-26 DIAGNOSIS — K75.81 NASH (NONALCOHOLIC STEATOHEPATITIS): Primary | ICD-10-CM

## 2023-04-26 PROCEDURE — 3017F COLORECTAL CA SCREEN DOC REV: CPT | Performed by: PHYSICIAN ASSISTANT

## 2023-04-26 PROCEDURE — 1090F PRES/ABSN URINE INCON ASSESS: CPT | Performed by: PHYSICIAN ASSISTANT

## 2023-04-26 PROCEDURE — G8427 DOCREV CUR MEDS BY ELIG CLIN: HCPCS | Performed by: PHYSICIAN ASSISTANT

## 2023-04-26 PROCEDURE — 1101F PT FALLS ASSESS-DOCD LE1/YR: CPT | Performed by: PHYSICIAN ASSISTANT

## 2023-04-26 PROCEDURE — 1123F ACP DISCUSS/DSCN MKR DOCD: CPT | Performed by: PHYSICIAN ASSISTANT

## 2023-04-26 PROCEDURE — 3078F DIAST BP <80 MM HG: CPT | Performed by: PHYSICIAN ASSISTANT

## 2023-04-26 PROCEDURE — G8432 DEP SCR NOT DOC, RNG: HCPCS | Performed by: PHYSICIAN ASSISTANT

## 2023-04-26 PROCEDURE — 99213 OFFICE O/P EST LOW 20 MIN: CPT | Performed by: PHYSICIAN ASSISTANT

## 2023-04-26 PROCEDURE — G8536 NO DOC ELDER MAL SCRN: HCPCS | Performed by: PHYSICIAN ASSISTANT

## 2023-04-26 PROCEDURE — G8400 PT W/DXA NO RESULTS DOC: HCPCS | Performed by: PHYSICIAN ASSISTANT

## 2023-04-26 PROCEDURE — G8417 CALC BMI ABV UP PARAM F/U: HCPCS | Performed by: PHYSICIAN ASSISTANT

## 2023-04-26 PROCEDURE — 3075F SYST BP GE 130 - 139MM HG: CPT | Performed by: PHYSICIAN ASSISTANT

## 2023-04-26 PROCEDURE — G0463 HOSPITAL OUTPT CLINIC VISIT: HCPCS | Performed by: PHYSICIAN ASSISTANT

## 2023-04-26 RX ORDER — GABAPENTIN 300 MG/1
CAPSULE ORAL
COMMUNITY
Start: 2023-03-30

## 2023-04-26 RX ORDER — FENOFIBRATE 134 MG/1
CAPSULE ORAL
COMMUNITY
Start: 2023-01-26

## 2023-04-26 NOTE — PROGRESS NOTES
[facility], MD, Giuseppe Gilbert, Jimena Wilson [place], [place]      JESSICA Roy, AGPCNP-BC   Alfred Walsh, ACNPC-AG   Kelly Mullen, FNP-C  Stephanie Snow, FNP-C   Minerva Trujillo, AGPCNP-BC      [address]   at [address], [address]  [address].   [phone]   FAX: [address]   at [address], [address] [place] [address], [address] [place] [address] Street - [address]   [phone]   FAX: [phone]       Patient Care Team:  Sher Hooker MD as PCP - General (Family Medicine)  Sher Hooker MD as PCP - [address] [place] [address] Empaneled Provider  Coyr Noble MD (Gastroenterology)  Romeo Yang MD (General Surgery)      Problem List  Date Reviewed: 11/29/2022            Codes Class Noted    Sleep apnea ICD-10-CM: G47.30  ICD-9-CM: 780.57  1/26/2019        KELSEY (nonalcoholic steatohepatitis) ICD-10-CM: K75.81  ICD-9-CM: 571.8  1/25/2019        Diabetes mellitus, type 2 ([facility] ICD-10-CM: E11.9  ICD-9-CM: 250.00  1/25/2019        Hypertension ICD-10-CM: I10  ICD-9-CM: 401.9  1/25/2019        Hypothyroidism ICD-10-CM: E03.9  ICD-9-CM: 244.9  1/25/2019        H/O carpal tunnel repair ICD-10-CM: Z98.890  ICD-9-CM: V15.29  1/25/2019        S/P CARIDAD (total abdominal hysterectomy) ICD-10-CM: Z90.710  ICD-9-CM: V88.01  1/25/2019        History of cholecystectomy ICD-10-CM: Z90.49  ICD-9-CM: V45.79  1/25/2019        Kidney stones ICD-10-CM: N20.0  ICD-9-CM: 592.0  1/25/2019        H/O total knee replacement ICD-10-CM: F65.606  ICD-9-CM: V43.65  8/26/2013           Ingrid Park returns to the [place] [address] for management of non-alcoholic steatohepatitis (KELSEY).  The active problem list, all pertinent past medical history, medications, radiologic findings and laboratory findings related to the liver disorder were reviewed with the patient. The patient is a 81 y.o.  female who was found elevated liver enzymes in 2018 and KELSEY on liver biopsy in 10/2018. 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 MRI performed in 10/2018 suggested fatty liver disease. The most recent Fibroscan in 8/2022 was 11.5 with  suggesting fatty liver and stage 3 fibrosis. The patient underwent a liver biopsy in 10/2022. This demonstrates mild KELSEY with stage 3 fibrosis. She has had tried to lose weight without sustained success sine 2018. She says she has tried everything. Portion control, increase physical activity, keto diet was stopped due to her elevation in TGs. She was then given a trial of Vascepa, which caused abdominal bloating, nausea and was stopped. She has since been given fenofibrate and is tolerating this well. Recent A1c was 6.6%. She is not on any medications for DM. She has had some issues with bulging disks in the back and has had to go through 3 courses of prednisone. She has noted that gabapentin has really helped her symptoms. The patient has no symptoms which can be attributed to the liver disorder. The patient has not experienced fatigue, pain in the right side over the liver. The patient completes all daily activities without any functional limitations. ASSESSMENT AND PLAN:  KELSEY  The diagnosis is based upon liver biopsy. A liver biopsy in 10/2018 shows KELSEY stage 2-3 fibrosis. A liver biopsy in 10/2022 shows mild KELSEY with stage 3 fibrosis.     Fibroscan in 9/2019 was 7.2 EkPa, Cap 380 suggesting fatty liver and stage 1 fibrosis   Fibroscan in 8/2021 was 5.9 EkPa, Cap 279 suggesting fatty liver and stage 0-1 fibrosis   Fibroscan in 9/2019 was 11.5 EkPa, Cap 330 suggesting fatty liver and stage 3 fibrosis     There is currently no FDA approved medical treatment for fatty liver, NALFD or KELSEY.  The only medical treatments for KELSEY are though clinical trials. The patient would like to participate in a clinical trial.    Counseling for diet and weight loss in patients with confirmed or suspected NAFLD  The patient was counseled regarding diet and exercise to achieve weight loss. The best diet for patients with fatty liver is one very low in carbohydrates and enriched with protein. The patient was told not to consume any food products and drinks containing fructose as this enhances hepatic fat synthesis. We have discussed possible start of one of the GLP agonist medications for dual therapy. There is no medication or vitamin supplements that we advocate for KELESY. Using glitazones in patients without diabetes mellitus has been shown to reduce fat content in the liver but has no effect on fibrosis and is associated with weight gain. Vitamin E has also been used but the data is not very good and most experts no longer advocate this. 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 This single mutation can cause an elevation in ferritin but it does not lead to iron overload in the liver or other organs. A liver biopsy does not demonstrate evidence of iron overload. Treatment of other medical problems in patients with chronic liver disease  There are no contraindications for the patient to take most medications that are necessary for treatment of other medical issues. The patient can take the following medications as these will not impact the patient's current liver disease. Any medications utilized for treatment of DM  Statins to treat hypercholesterolemia or triglycerides, as above. The patient does not comsume alcohol on a daily basis.   Normal doses of acetaminophen, as recommended on the label of the bottle, are not hepatotoxic except in the setting of daily alcohol use, even in patients with cirrhosis and can be utilized for pain.    Counseling for alcohol in patients with chronic liver disease  The patient was counseled regarding alcohol consumption and the effect of alcohol on chronic liver disease. The patient does not consume any significant amount of alcohol. Vaccinations   The need for vaccination against viral hepatitis A and B will be assessed with serologic and instituted as appropriate. Routine vaccinations against other bacterial and viral agents can be performed as indicated. Annual flu vaccination should be administered if indicated. She has had full COVID vaccination. ALLERGIES  Allergies   Allergen Reactions    Augmentin [Amoxicillin-Pot Clavulanate] Other (comments)     Tachycardia    Codeine Other (comments)     Itch/rash    Morphine Itching    Niaspan [Niacin] Rash    Pcn [Penicillins] Rash and Other (comments)     Nose Bleed    Sulfa Dyne Unknown (comments)     Can't remember reaction    Tetracycline Rash     Facial Rash       MEDICATIONS  Current Outpatient Medications   Medication Sig    amLODIPine (NORVASC) 5 mg tablet Take 5 mg by mouth daily. Levothyroxine 100 mcg cap Take 100 mcg by mouth Daily (before breakfast). Indications: a condition with low thyroid hormone levels    losartan (COZAAR) 100 mg tablet Take 100 mg by mouth daily. Indications: high blood pressure     No current facility-administered medications for this visit. SYSTEM REVIEW NOT RELATED TO LIVER DISEASE OR REVIEWED ABOVE:  Constitution systems: Negative for fever, chills. Weight stable since last office visit. Eyes: Negative for visual changes. ENT: Negative for sore throat, painful swallowing. Respiratory: Negative for cough, hemoptysis, SOB. Cardiology: Negative for chest pain, palpitations. GI:  Negative for constipation or diarrhea. : Negative for urinary frequency, dysuria, hematuria, nocturia. Skin: Negative for rash. Hematology: Negative for easy bruising, blood clots.     Musculo-skeletal: Negative for back pain, muscle pain, weakness. Neurologic: Negative for headaches, dizziness, vertigo, memory problems not related to HE. Psychology: Negative for anxiety, depression. FAMILY HISTORY:  The father  of MI. The mother  of lung cancer. There is no family history of liver disease. SOCIAL HISTORY:  The patient is . The patient has 2 children, and 2 grandchildren. The patient has never used tobacco products. The patient has never consumed significant amounts of alcohol. The patient currently works part time as  - she has had increased hours. PHYSICAL EXAMINATION:  VS: per nursing note  General: No acute distress. Eyes: Sclera anicteric. ENT: No oral lesions. Thyroid normal.  Nodes: No adenopathy. Skin: No spider angiomata. No jaundice. No palmar erythema. Respiratory: Lungs clear to auscultation. Cardiovascular: Regular heart rate. No murmurs. No JVD. Abdomen: Soft non-tender, liver size normal to percussion/palpation. Spleen not palpable. No obvious ascites. Extremities: No edema. No muscle wasting. No gross arthritic changes. Neurologic: Alert and oriented. Cranial nerves grossly intact. No asterixis.     LABORATORY STUDIES:  From 2023  AST/ALT/ALP/T Bili/ALB:20/22/56/0.7/4.3  WBC/HB/PLT/INR:7.9/15.0/356  NA/BUN/CREAT:138/15/0.88  Hgb A1c 6.6%    Liver [place] [address]  [address] Units 2022 2022 8/10/2021   WBC 3.6 - 11.0 K/uL 8.5 8.8    ANC 1.8 - 8.0 K/UL 4.7     HGB 11.5 - 16.0 g/dL 15.7 15.2     - 400 K/uL 391 399    INR 0.9 - 1.1   1.0     AST 15 - 37 U/L 31 24 27   ALT 12 - 78 U/L 45 31 21   Alk Phos 45 - 117 U/L 81 97    Bili, Total 0.2 - 1.0 MG/DL 0.8 0.6 0.3   Bili, Direct 0.0 - 0.2 MG/DL 0.2 0.2    Albumin 3.5 - 5.0 g/dL 4.3 4.2    BUN 6 - 20 MG/DL 14 9    Creat 0.55 - 1.02 MG/DL 0.84 0.86    Na 136 - 145 mmol/L 139 137    K 3.5 - 5.1 mmol/L 4.4 4.9    Cl 97 - 108 mmol/L 104 104    CO2 21 - 32 mmol/L 28 27    Glucose 65 - 100 mg/dL 111 (H) 112 (H) 107 (H)     Cancer Screening Latest Ref Rng & Units 8/16/2022 8/10/2021   AFP, Serum 0.0 - 9.2 ng/mL 3.4 3.0   AFP-L3% 0.0 - 9.9 % Comment Comment     SEROLOGIES:  10/2018. Ferritin 460, HFE genetic testing sinlge mutation C282Y, HANNAH negative, ASMA negative. LIVER HISTOLOGY:  10/2018. Liver biopsy from [address] [place] [address].  KELSEY with stage 2-3 fibrosis. 9/2019. FibroScan performed at [facility]". EkPa was 7.2. Suggested fibrosis level is F1-2. CAP score is 380, this is consistent with steatosis. 8/2021. FibroScan performed at [facility]". EkPa was 5.9. Suggested fibrosis level is F1. CAP score is 279, this is consistent with steatosis. 8/2022. FibroScan performed at [facility]". EkPa was 11.5. Suggested fibrosis level is F3-4. CAP score 330, this is consistent with steatosis. 10/2022. Slides reviewed by MLS. KELSEY. 25-33% macrovesicular and micovesicular steatosis, mild inflammation, mild ballooning, Stage 3 fibrosis. JESIKA (111). ENDOSCOPIC PROCEDURES:  9/2018. EGD by Dr Lenny Trivedi. Normal.    RADIOLOGY:  10/2018. MRI of the liver. Changes consistent with fatty liver. No liver mass lesions. Normal spleen. No ascites. OTHER TESTING:  Not available or performed    FOLLOW-UP:  All of the issues listed above in the Assessment and Plan were discussed with the patient. All questions were answered. The patient expressed a clear understanding of the above. [facility] 87 in 6 months for repeat FS if not enrolled in clinical trial.     Documentation reviewed and updated to reflect current, accurate patient information.     Rika Nash PA-C  Liver [place] [facility], [address] [facility] Wilda Vazquezstan[address]  22.  [phone]  [address] [facility]

## 2023-04-26 NOTE — PROGRESS NOTES
Identified pt with two pt identifiers(name and ). Reviewed record in preparation for visit and have obtained necessary documentation. Chief Complaint   Patient presents with    Other     4month KELSEY follow up      Vitals:    23 1041   BP: 132/73   Pulse: 83   Temp: 96.9 °F (36.1 °C)   TempSrc: Temporal   SpO2: 97%   Weight: 182 lb 3.2 oz (82.6 kg)   Height: 5' 4\" (1.626 m)   PainSc:   0 - No pain       Health Maintenance Review: Patient reminded of \"due or due soon\" health maintenance. I have asked the patient to contact his/her primary care provider (PCP) for follow-up on his/her health maintenance. Coordination of Care Questionnaire:  :   1) Have you been to an emergency room, urgent care, or hospitalized since your last visit? If yes, where when, and reason for visit? Yes. ED visit to [facility] in March for back/neck pain       2. Have seen or consulted any other health care provider since your last visit? If yes, where when, and reason for visit? YES. Ortho      Patient is accompanied by self I have received verbal consent from [address] [place] [address]. Hood to discuss any/all medical information while they are present in the room.

## 2023-05-09 ENCOUNTER — HOSPITAL ENCOUNTER (OUTPATIENT)
Facility: HOSPITAL | Age: 73
Discharge: HOME | End: 2023-05-12
Payer: MEDICARE

## 2023-05-09 DIAGNOSIS — M54.12 BRACHIAL RADICULITIS: ICD-10-CM

## 2023-05-09 PROCEDURE — 72050 X-RAY EXAM NECK SPINE 4/5VWS: CPT

## 2023-05-14 RX ORDER — FENOFIBRATE 134 MG/1
CAPSULE ORAL
COMMUNITY
Start: 2023-01-26

## 2023-05-14 RX ORDER — GABAPENTIN 300 MG/1
CAPSULE ORAL
COMMUNITY
Start: 2023-03-30

## 2023-06-16 ENCOUNTER — HOSPITAL ENCOUNTER (OUTPATIENT)
Facility: HOSPITAL | Age: 73
Discharge: HOME | End: 2023-06-19
Payer: MEDICARE

## 2023-06-16 DIAGNOSIS — M25.531 RIGHT WRIST PAIN: ICD-10-CM

## 2023-06-16 PROCEDURE — 73110 X-RAY EXAM OF WRIST: CPT

## 2023-12-07 ENCOUNTER — OFFICE VISIT (OUTPATIENT)
Age: 73
End: 2023-12-07
Payer: MEDICARE

## 2023-12-07 VITALS
WEIGHT: 172.2 LBS | TEMPERATURE: 96.9 F | SYSTOLIC BLOOD PRESSURE: 133 MMHG | OXYGEN SATURATION: 98 % | HEART RATE: 77 BPM | RESPIRATION RATE: 16 BRPM | HEIGHT: 64 IN | DIASTOLIC BLOOD PRESSURE: 59 MMHG | BODY MASS INDEX: 29.4 KG/M2

## 2023-12-07 DIAGNOSIS — K75.81 NONALCOHOLIC STEATOHEPATITIS (NASH): Primary | ICD-10-CM

## 2023-12-07 PROCEDURE — G8484 FLU IMMUNIZE NO ADMIN: HCPCS | Performed by: PHYSICIAN ASSISTANT

## 2023-12-07 PROCEDURE — 4004F PT TOBACCO SCREEN RCVD TLK: CPT | Performed by: PHYSICIAN ASSISTANT

## 2023-12-07 PROCEDURE — 3075F SYST BP GE 130 - 139MM HG: CPT | Performed by: PHYSICIAN ASSISTANT

## 2023-12-07 PROCEDURE — 3078F DIAST BP <80 MM HG: CPT | Performed by: PHYSICIAN ASSISTANT

## 2023-12-07 PROCEDURE — G8419 CALC BMI OUT NRM PARAM NOF/U: HCPCS | Performed by: PHYSICIAN ASSISTANT

## 2023-12-07 PROCEDURE — 3017F COLORECTAL CA SCREEN DOC REV: CPT | Performed by: PHYSICIAN ASSISTANT

## 2023-12-07 PROCEDURE — 99214 OFFICE O/P EST MOD 30 MIN: CPT | Performed by: PHYSICIAN ASSISTANT

## 2023-12-07 PROCEDURE — G8427 DOCREV CUR MEDS BY ELIG CLIN: HCPCS | Performed by: PHYSICIAN ASSISTANT

## 2023-12-07 PROCEDURE — 91200 LIVER ELASTOGRAPHY: CPT | Performed by: PHYSICIAN ASSISTANT

## 2023-12-07 PROCEDURE — 1090F PRES/ABSN URINE INCON ASSESS: CPT | Performed by: PHYSICIAN ASSISTANT

## 2023-12-07 PROCEDURE — 1123F ACP DISCUSS/DSCN MKR DOCD: CPT | Performed by: PHYSICIAN ASSISTANT

## 2023-12-07 PROCEDURE — G8400 PT W/DXA NO RESULTS DOC: HCPCS | Performed by: PHYSICIAN ASSISTANT

## 2023-12-07 RX ORDER — SEMAGLUTIDE 1.34 MG/ML
1 INJECTION, SOLUTION SUBCUTANEOUS
COMMUNITY

## 2023-12-07 NOTE — PROGRESS NOTES
MD Saira, FACP, [place], [place]      MEHDI Mullins, AGPCNP-BC   Dimitri Raina, [facility] Hospital[facility]AG   Dustysvetlana Fong, PEDRO-C  Viktoria Kellie, FNNONA-C [place] Heimlich, AGPCNP-BC      [address]   at [facility]   [address] [facility], [address] [facility], [address] [facility]   [phone]   FAX: [phone]   at [facility], [facility], [address]   [phone]   FAX: [phone]     Patient Care Team:  Abdias Rob MD as PCP - General  Abdias Rob MD as PCP - Empaneled Provider    Patient Active Problem List   Diagnosis    KIM (nonalcoholic steatohepatitis)    Sleep apnea    Hypothyroidism    Diabetes mellitus, type 2 ([facility])    H/O total knee replacement    Hypertension    S/P DALLAS (total abdominal hysterectomy)    H/O carpal tunnel repair    Kidney stones    History of cholecystectomy     Della L. Rochel Mohs returns to the [address] [facility] for management of metabolic-associated steatohepatitis (MASH). The active problem list, all pertinent past medical history, medications, radiologic findings and laboratory findings related to the liver disorder were reviewed with the patient. The patient is a 72 y.o.  female who was found elevated liver enzymes in 2018 and MASH on liver biopsy in 10/2018. 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 MRI performed in 10/2018 suggested fatty liver disease. The most recent Fibroscan in 8/2022 was 11.5 with  suggesting fatty liver and stage 3 fibrosis. The patient underwent a liver biopsy in 10/2022. This demonstrates mild KIM with stage 3 fibrosis. We plan on repeating Fibroscan in the office today.      She has had tried to lose weight without

## 2023-12-08 ENCOUNTER — CLINICAL DOCUMENTATION (OUTPATIENT)
Age: 73
End: 2023-12-08

## 2023-12-08 ENCOUNTER — RESEARCH ENCOUNTER (OUTPATIENT)
Age: 73
End: 2023-12-08

## 2023-12-08 NOTE — PROGRESS NOTES
Reviewed patient chart for participation in a clinical trial. Spoke with patient via telephone, patient reports taking last dose of Ozempic today and potentially would qualify to screen for [employer] in 3 months. Consent mailed and will await return phone call to schedule a screening visit. Pt scheduled for screening Liver Biopsy with Dr Ant Melgoza on14 Mar 2023.

## 2024-02-09 ENCOUNTER — PREP FOR PROCEDURE (OUTPATIENT)
Age: 74
End: 2024-02-09

## 2024-02-09 DIAGNOSIS — Z00.6 EXAMINATION OF PARTICIPANT IN CLINICAL TRIAL: ICD-10-CM

## 2024-03-05 ENCOUNTER — TRANSCRIBE ORDERS (OUTPATIENT)
Facility: HOSPITAL | Age: 74
End: 2024-03-05

## 2024-03-05 DIAGNOSIS — Z00.6 EXAMINATION OF PARTICIPANT IN CLINICAL TRIAL: Primary | ICD-10-CM

## 2024-03-11 ENCOUNTER — OFFICE VISIT (OUTPATIENT)
Age: 74
End: 2024-03-11

## 2024-03-11 DIAGNOSIS — Z00.6 EXAMINATION OF PARTICIPANT IN CLINICAL TRIAL: Primary | ICD-10-CM

## 2024-03-11 PROCEDURE — 1090F PRES/ABSN URINE INCON ASSESS: CPT | Performed by: NURSE PRACTITIONER

## 2024-03-11 PROCEDURE — G8484 FLU IMMUNIZE NO ADMIN: HCPCS | Performed by: NURSE PRACTITIONER

## 2024-03-11 PROCEDURE — G8419 CALC BMI OUT NRM PARAM NOF/U: HCPCS | Performed by: NURSE PRACTITIONER

## 2024-03-11 PROCEDURE — 99213 OFFICE O/P EST LOW 20 MIN: CPT | Performed by: NURSE PRACTITIONER

## 2024-03-11 PROCEDURE — 1123F ACP DISCUSS/DSCN MKR DOCD: CPT | Performed by: NURSE PRACTITIONER

## 2024-03-11 PROCEDURE — G8400 PT W/DXA NO RESULTS DOC: HCPCS | Performed by: NURSE PRACTITIONER

## 2024-03-11 PROCEDURE — 1036F TOBACCO NON-USER: CPT | Performed by: NURSE PRACTITIONER

## 2024-03-11 PROCEDURE — 3017F COLORECTAL CA SCREEN DOC REV: CPT | Performed by: NURSE PRACTITIONER

## 2024-03-11 PROCEDURE — G8427 DOCREV CUR MEDS BY ELIG CLIN: HCPCS | Performed by: NURSE PRACTITIONER

## 2024-03-11 NOTE — PROGRESS NOTES
[facility]      Bakari Hardwick MD, FACP, FACG, FAASLD      MEHDI Salcido, [facility]   Lorenza Fairbanksflaquitocassia, [facility]   Lida Rivas, [facility]-  Fredi Grubbs, [facility]   Valentine La, [facility]   Yolie Hutsonon, [facility]   [address], [address]   [place]  [address] [phone]   FAX: [phone]  [facility]   [address] [facility] [address], [address]   [place]  [address] [phone]   FAX: [phone]         HEPATOLOGY CLINICAL RESEARCH NOTE    Ashanti Cedeno is a 73 y.o. female with KIM.    The patient was seen today for screening to enroll into the Petey Nordisk 4553 clinical trial for patients with MASH.    This is a randomized placebo-controlled study to assess the effect of semaglutide in patients with non-cirrhotic KIM.       The consent form was reviewed with the patient.    Potential side effects which may occur in the clinical trial were discussed.  The potential benefits of the investigational product were discussed.  The patient is aware this is a randomized placebo-controlled clinical trial and s/he may be receiving placebo.  All questions raised by the patient were answered.    The patient has decided to enter the clinical study and the consent for was signed.    The consent form was signed before any physical examination, laboratory or other testing was performed.    The medical history was reviewed.      Today's visit was conducted per the study protocol.    The patient was asked if any symptoms, side effects or adverse events have occurred since the last study visit.     A physical examination was performed.      All symptoms reported by the patient and the findings of the physical examination were recorded in the clinical trial documents.    Symptoms of clinical

## 2024-03-21 LAB — HBA1C MFR BLD HPLC: 5.9 %

## 2024-03-26 ENCOUNTER — OFFICE VISIT (OUTPATIENT)
Age: 74
End: 2024-03-26

## 2024-03-26 DIAGNOSIS — Z00.6 EXAMINATION OF PARTICIPANT IN CLINICAL TRIAL: Primary | ICD-10-CM

## 2024-03-26 PROCEDURE — G8484 FLU IMMUNIZE NO ADMIN: HCPCS | Performed by: NURSE PRACTITIONER

## 2024-03-26 PROCEDURE — G8400 PT W/DXA NO RESULTS DOC: HCPCS | Performed by: NURSE PRACTITIONER

## 2024-03-26 PROCEDURE — 1090F PRES/ABSN URINE INCON ASSESS: CPT | Performed by: NURSE PRACTITIONER

## 2024-03-26 PROCEDURE — 1036F TOBACCO NON-USER: CPT | Performed by: NURSE PRACTITIONER

## 2024-03-26 PROCEDURE — G8419 CALC BMI OUT NRM PARAM NOF/U: HCPCS | Performed by: NURSE PRACTITIONER

## 2024-03-26 PROCEDURE — 99213 OFFICE O/P EST LOW 20 MIN: CPT | Performed by: NURSE PRACTITIONER

## 2024-03-26 PROCEDURE — 3017F COLORECTAL CA SCREEN DOC REV: CPT | Performed by: NURSE PRACTITIONER

## 2024-03-26 PROCEDURE — 1123F ACP DISCUSS/DSCN MKR DOCD: CPT | Performed by: NURSE PRACTITIONER

## 2024-03-26 PROCEDURE — G8427 DOCREV CUR MEDS BY ELIG CLIN: HCPCS | Performed by: NURSE PRACTITIONER

## 2024-03-26 NOTE — PROGRESS NOTES
[facility]. EkPa was 11.5.  Suggested fibrosis level is F3-4.  CAP score 330, this is consistent with steatosis.  10/2022.  Slides reviewed by MLS.  KIM.  25-33% macrovesicular and micovesicular steatosis, mild inflammation, mild ballooning, Stage 3 fibrosis.  BONI (111).  12/2023.  FibroScan performed at [facility]. EkPa was 8.6.  Suggested fibrosis level is F2.  CAP score 265, this is a reduction in steatosis.  3/2024. Liver; biopsy by MLS.  Mild~15%  macrovesicular steatosis with mild portal Fibrosis: Stage 2      ENDOSCOPIC PROCEDURES:  9/2018.  EGD by Dr Luther.  Normal.     RADIOLOGY:  10/2018.  MRI of the liver.  Changes consistent with fatty liver.  No liver mass lesions. Normal spleen.  No ascites.      The patient will return for follow-up per study protocol.        Lida Hathaway, FNP-C  [facility] Liver [address], [address]  [place]  [address] [phone]  [facility]

## 2024-06-10 ENCOUNTER — OFFICE VISIT (OUTPATIENT)
Age: 74
End: 2024-06-10
Payer: MEDICARE

## 2024-06-10 VITALS
HEIGHT: 64 IN | DIASTOLIC BLOOD PRESSURE: 69 MMHG | BODY MASS INDEX: 30.59 KG/M2 | WEIGHT: 179.2 LBS | OXYGEN SATURATION: 98 % | RESPIRATION RATE: 18 BRPM | HEART RATE: 61 BPM | SYSTOLIC BLOOD PRESSURE: 170 MMHG | TEMPERATURE: 98.9 F

## 2024-06-10 DIAGNOSIS — K75.81 NONALCOHOLIC STEATOHEPATITIS (NASH): Primary | ICD-10-CM

## 2024-06-10 PROCEDURE — G8417 CALC BMI ABV UP PARAM F/U: HCPCS | Performed by: PHYSICIAN ASSISTANT

## 2024-06-10 PROCEDURE — 3017F COLORECTAL CA SCREEN DOC REV: CPT | Performed by: PHYSICIAN ASSISTANT

## 2024-06-10 PROCEDURE — 3078F DIAST BP <80 MM HG: CPT | Performed by: PHYSICIAN ASSISTANT

## 2024-06-10 PROCEDURE — G8400 PT W/DXA NO RESULTS DOC: HCPCS | Performed by: PHYSICIAN ASSISTANT

## 2024-06-10 PROCEDURE — G8427 DOCREV CUR MEDS BY ELIG CLIN: HCPCS | Performed by: PHYSICIAN ASSISTANT

## 2024-06-10 PROCEDURE — 3077F SYST BP >= 140 MM HG: CPT | Performed by: PHYSICIAN ASSISTANT

## 2024-06-10 PROCEDURE — 1036F TOBACCO NON-USER: CPT | Performed by: PHYSICIAN ASSISTANT

## 2024-06-10 PROCEDURE — 1090F PRES/ABSN URINE INCON ASSESS: CPT | Performed by: PHYSICIAN ASSISTANT

## 2024-06-10 PROCEDURE — 1123F ACP DISCUSS/DSCN MKR DOCD: CPT | Performed by: PHYSICIAN ASSISTANT

## 2024-06-10 PROCEDURE — 99213 OFFICE O/P EST LOW 20 MIN: CPT | Performed by: PHYSICIAN ASSISTANT

## 2024-06-10 ASSESSMENT — PATIENT HEALTH QUESTIONNAIRE - PHQ9
SUM OF ALL RESPONSES TO PHQ9 QUESTIONS 1 & 2: 0
SUM OF ALL RESPONSES TO PHQ QUESTIONS 1-9: 0
DEPRESSION UNABLE TO ASSESS: FUNCTIONAL CAPACITY MOTIVATION LIMITS ACCURACY
SUM OF ALL RESPONSES TO PHQ QUESTIONS 1-9: 0
2. FEELING DOWN, DEPRESSED OR HOPELESS: NOT AT ALL
1. LITTLE INTEREST OR PLEASURE IN DOING THINGS: NOT AT ALL
SUM OF ALL RESPONSES TO PHQ QUESTIONS 1-9: 0
SUM OF ALL RESPONSES TO PHQ QUESTIONS 1-9: 0

## 2024-06-10 NOTE — PROGRESS NOTES
[facility]      Bakari Hardwick MD, FACP, FACG, FAASLD      MEHDI Salcido, [facility] CenterNP-BC   Lorenza Lin, ACN-   Lida Hathaway, FNP-C  Fredi Grubbs, FNP-C   Valentine La, [facility]   at [facility]   [address], [address]   [place]  [address] [phone]   FAX: [phone]  [facility]   [address], [address]   [place]  [address] [phone]   FAX: [phone]     Patient Care Team:  Tami Grissom MD as PCP - General  Tami Grissom MD as PCP - Empaneled Provider    Patient Active Problem List   Diagnosis    KIM (nonalcoholic steatohepatitis)    Sleep apnea    Hypothyroidism    Diabetes mellitus, type 2 (HCC)    H/O total knee replacement    Hypertension    S/P DALLAS (total abdominal hysterectomy)    H/O carpal tunnel repair    Kidney stones    History of cholecystectomy    Examination of participant in clinical trial     Ashanti Cedeno returns to the [facility] for management of metabolic-associated steatohepatitis (MASH). The active problem list, all pertinent past medical history, medications, radiologic findings and laboratory findings related to the liver disorder were reviewed with the patient.      The patient is a 74 y.o.  female who was found elevated liver enzymes in 2018 and MASH on liver biopsy in 10/2018.     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    MRI performed in 10/2018 suggested fatty liver disease.      The most recent Fibroscan in 8/2022 was 11.5 with  suggesting fatty liver and stage 3 fibrosis.  The patient underwent a liver biopsy in 10/2022.  This demonstrates mild KIM with stage 3 fibrosis.  Repeat Fibroscan in 12/2023 showed EkPa was 8.6.

## 2024-06-10 NOTE — PROGRESS NOTES
Identified pt with two pt identifiers(name and ). Reviewed record in preparation for visit and have obtained necessary documentation.  Vitals:    06/10/24 0749   Pulse: 61   Resp: 18   Temp: 98.9 °F (37.2 °C)   TempSrc: Temporal   SpO2: 98%   Weight: 81.3 kg (179 lb 3.2 oz)   Height: 1.626 m (5' 4\")        Health Maintenance Review: Patient reminded of \"due or due soon\" health maintenance. I have asked the patient to contact his/her primary care provider (PCP) for follow-up on his/her health maintenance.    Coordination of Care Questionnaire:  :   1) Have you been to an emergency room, urgent care, or hospitalized since your last visit?  If yes, where when, and reason for visit? no       2. Have seen or consulted any other health care provider since your last visit?   If yes, where when, and reason for visit?  No      Patient is accompanied by self I have received verbal consent from Ashanti Cedeno to discuss any/all medical information while they are present in the room.

## 2024-06-11 ENCOUNTER — TELEPHONE (OUTPATIENT)
Age: 74
End: 2024-06-11

## 2024-06-11 NOTE — TELEPHONE ENCOUNTER
----- Message from SPENCER Sykes sent at 6/10/2024  8:06 AM EDT -----  Regarding: please call for results  Please call PCP, Dr Grissom for a copy of the full labs that she has had in 5/2024.     Thanks,  Eli        6/11/24@0902 Request labs from PCP office. Pending results. (KF)---0911 Rec'd lab results, placed on [facility] desk to review.(JOSÉ MIGUEL)

## 2024-06-11 NOTE — TELEPHONE ENCOUNTER
----- Message from SPENCER Sykes sent at 6/10/2024  8:06 AM EDT -----  Regarding: please call for results  Please call PCP, Dr Grissom for a copy of the full labs that she has had in 5/2024.     Thanks,  Eli

## 2024-12-11 ENCOUNTER — OFFICE VISIT (OUTPATIENT)
Age: 74
End: 2024-12-11
Payer: MEDICARE

## 2024-12-11 VITALS
OXYGEN SATURATION: 97 % | SYSTOLIC BLOOD PRESSURE: 128 MMHG | BODY MASS INDEX: 31 KG/M2 | HEART RATE: 75 BPM | HEIGHT: 64 IN | WEIGHT: 181.6 LBS | DIASTOLIC BLOOD PRESSURE: 53 MMHG | TEMPERATURE: 98.4 F

## 2024-12-11 DIAGNOSIS — K75.81 NONALCOHOLIC STEATOHEPATITIS (NASH): Primary | ICD-10-CM

## 2024-12-11 PROCEDURE — G8484 FLU IMMUNIZE NO ADMIN: HCPCS | Performed by: PHYSICIAN ASSISTANT

## 2024-12-11 PROCEDURE — 1090F PRES/ABSN URINE INCON ASSESS: CPT | Performed by: PHYSICIAN ASSISTANT

## 2024-12-11 PROCEDURE — G8427 DOCREV CUR MEDS BY ELIG CLIN: HCPCS | Performed by: PHYSICIAN ASSISTANT

## 2024-12-11 PROCEDURE — 1036F TOBACCO NON-USER: CPT | Performed by: PHYSICIAN ASSISTANT

## 2024-12-11 PROCEDURE — 99213 OFFICE O/P EST LOW 20 MIN: CPT | Performed by: PHYSICIAN ASSISTANT

## 2024-12-11 PROCEDURE — 1159F MED LIST DOCD IN RCRD: CPT | Performed by: PHYSICIAN ASSISTANT

## 2024-12-11 PROCEDURE — G8417 CALC BMI ABV UP PARAM F/U: HCPCS | Performed by: PHYSICIAN ASSISTANT

## 2024-12-11 PROCEDURE — 1160F RVW MEDS BY RX/DR IN RCRD: CPT | Performed by: PHYSICIAN ASSISTANT

## 2024-12-11 PROCEDURE — 3078F DIAST BP <80 MM HG: CPT | Performed by: PHYSICIAN ASSISTANT

## 2024-12-11 PROCEDURE — 3017F COLORECTAL CA SCREEN DOC REV: CPT | Performed by: PHYSICIAN ASSISTANT

## 2024-12-11 PROCEDURE — 3074F SYST BP LT 130 MM HG: CPT | Performed by: PHYSICIAN ASSISTANT

## 2024-12-11 PROCEDURE — G8400 PT W/DXA NO RESULTS DOC: HCPCS | Performed by: PHYSICIAN ASSISTANT

## 2024-12-11 PROCEDURE — 1123F ACP DISCUSS/DSCN MKR DOCD: CPT | Performed by: PHYSICIAN ASSISTANT

## 2024-12-11 ASSESSMENT — PATIENT HEALTH QUESTIONNAIRE - PHQ9
DEPRESSION UNABLE TO ASSESS: FUNCTIONAL CAPACITY MOTIVATION LIMITS ACCURACY
SUM OF ALL RESPONSES TO PHQ QUESTIONS 1-9: 0
SUM OF ALL RESPONSES TO PHQ9 QUESTIONS 1 & 2: 0
2. FEELING DOWN, DEPRESSED OR HOPELESS: NOT AT ALL
1. LITTLE INTEREST OR PLEASURE IN DOING THINGS: NOT AT ALL
SUM OF ALL RESPONSES TO PHQ QUESTIONS 1-9: 0

## 2024-12-11 ASSESSMENT — ANXIETY QUESTIONNAIRES
3. WORRYING TOO MUCH ABOUT DIFFERENT THINGS: NOT AT ALL
GAD7 TOTAL SCORE: 0
1. FEELING NERVOUS, ANXIOUS, OR ON EDGE: NOT AT ALL
7. FEELING AFRAID AS IF SOMETHING AWFUL MIGHT HAPPEN: NOT AT ALL
2. NOT BEING ABLE TO STOP OR CONTROL WORRYING: NOT AT ALL
5. BEING SO RESTLESS THAT IT IS HARD TO SIT STILL: NOT AT ALL
IF YOU CHECKED OFF ANY PROBLEMS ON THIS QUESTIONNAIRE, HOW DIFFICULT HAVE THESE PROBLEMS MADE IT FOR YOU TO DO YOUR WORK, TAKE CARE OF THINGS AT HOME, OR GET ALONG WITH OTHER PEOPLE: NOT DIFFICULT AT ALL
6. BECOMING EASILY ANNOYED OR IRRITABLE: NOT AT ALL
4. TROUBLE RELAXING: NOT AT ALL

## 2024-12-11 NOTE — PROGRESS NOTES
Chief Complaint   Patient presents with    Follow-up     N/A     Vitals:    12/11/24 0806   BP: (!) 128/53   Site: Left Upper Arm   Position: Sitting   Pulse: 75   Temp: 98.4 °F (36.9 °C)   TempSrc: Temporal   SpO2: 97%   Weight: 82.4 kg (181 lb 9.6 oz)   Height: 1.626 m (5' 4\")     .  \"Have you been to the ER, urgent care clinic since your last visit?  Hospitalized since your last visit?\"    NO    “Have you seen or consulted any other health care providers outside of [facility] since your last visit?”    NO    Have you had a mammogram?”   YES - Where: [facility] Nurse/CMA to request most recent records if not in the chart    No breast cancer screening on file         “Have you had a colorectal cancer screening such as a colonoscopy/FIT/Cologuard?    YES - Type: Colonoscopy - Where: 2 years ago Nurse/CMA to request most recent records if not in the chart     No colonoscopy on file  No cologuard on file  No FIT/FOBT on file   No flexible sigmoidoscopy on file         Click Here for Release of Records Request    
General
104 104    CO2 21 - 32 mmol/L 28 27    Glucose 65 - 100 mg/dL 111 (H) 112 (H) 107 (H)     Cancer Screening Latest Ref Rng & Units 8/16/2022 8/10/2021   AFP, Serum 0.0 - 9.2 ng/mL 3.4 3.0   AFP-L3% 0.0 - 9.9 % Comment Comment     SEROLOGIES:  10/2018.  Ferritin 460, HFE genetic testing sinlge mutation C282Y, MYRA negative, ASMA negative.    LIVER HISTOLOGY:  10/2018.  Liver biopsy from [facility].  KIM with stage 2-3 fibrosis.  9/2019.  FibroScan performed at [facility]. EkPa was 7.2.  Suggested fibrosis level is F1-2.  CAP score is 380, this is consistent with steatosis.  8/2021.  FibroScan performed at [facility]. EkPa was 5.9.  Suggested fibrosis level is F1.  CAP score is 279, this is consistent with steatosis.   8/2022.  FibroScan performed at [facility]. EkPa was 11.5.  Suggested fibrosis level is F3-4.  CAP score 330, this is consistent with steatosis.  10/2022.  Slides reviewed by MLS.  KIM.  25-33% macrovesicular and micovesicular steatosis, mild inflammation, mild ballooning, Stage 3 fibrosis.  BONI (111).  12/2023.  FibroScan performed at [facility]. EkPa was 8.6.  Suggested fibrosis level is F2.  CAP score 265, this is a reduction in steatosis.  3/2024. Liver biopsy. Mild macrovesicular steatosis with mild portal fibrous expansion with   short septae (stage 2/ of 4). BONI Scoring System for NAFLD: Steatosis grade:     5-33%     (1 point)   Lobular inflammation:< 2/20x mag (1 point) Hepatocellular ballooning: none     (0 points) Activity score (BONI): 2       ENDOSCOPIC PROCEDURES:  9/2018.  EGD by Dr Luther.  Normal.    RADIOLOGY:  10/2018.  MRI of the liver.  Changes consistent with fatty liver.  No liver mass lesions. Normal spleen.  No ascites.    OTHER TESTING:  Not available or performed    FOLLOW-UP:  All of the issues listed above in the Assessment and Plan were discussed with the patient.  All questions were answered.  The

## 2025-04-30 ENCOUNTER — HOSPITAL ENCOUNTER (OUTPATIENT)
Facility: HOSPITAL | Age: 75
Discharge: HOME | End: 2025-05-03
Payer: MEDICARE

## 2025-04-30 VITALS
OXYGEN SATURATION: 97 % | DIASTOLIC BLOOD PRESSURE: 68 MMHG | TEMPERATURE: 97.5 F | RESPIRATION RATE: 14 BRPM | HEART RATE: 73 BPM | SYSTOLIC BLOOD PRESSURE: 142 MMHG | BODY MASS INDEX: 31.45 KG/M2 | HEIGHT: 64 IN | WEIGHT: 184.2 LBS

## 2025-04-30 LAB
ALBUMIN SERPL-MCNC: 4.2 G/DL (ref 3.5–5)
ALBUMIN/GLOB SERPL: 1.4 (ref 1.1–2.2)
ALP SERPL-CCNC: 78 U/L (ref 45–117)
ALT SERPL-CCNC: 33 U/L (ref 12–78)
ANION GAP SERPL CALC-SCNC: 6 MMOL/L (ref 2–12)
APPEARANCE UR: CLEAR
AST SERPL-CCNC: 19 U/L (ref 15–37)
BACTERIA URNS QL MICRO: NEGATIVE /HPF
BASOPHILS # BLD: 0.05 K/UL (ref 0–0.1)
BASOPHILS NFR BLD: 0.6 % (ref 0–1)
BILIRUB SERPL-MCNC: 0.5 MG/DL (ref 0.2–1)
BILIRUB UR QL: NEGATIVE
BUN SERPL-MCNC: 15 MG/DL (ref 6–20)
BUN/CREAT SERPL: 16 (ref 12–20)
CALCIUM SERPL-MCNC: 10.2 MG/DL (ref 8.5–10.1)
CHLORIDE SERPL-SCNC: 107 MMOL/L (ref 97–108)
CO2 SERPL-SCNC: 26 MMOL/L (ref 21–32)
COLOR UR: ABNORMAL
CREAT SERPL-MCNC: 0.94 MG/DL (ref 0.55–1.02)
DIFFERENTIAL METHOD BLD: ABNORMAL
EOSINOPHIL # BLD: 0.25 K/UL (ref 0–0.4)
EOSINOPHIL NFR BLD: 3.1 % (ref 0–7)
EPITH CASTS URNS QL MICRO: ABNORMAL /LPF
ERYTHROCYTE [DISTWIDTH] IN BLOOD BY AUTOMATED COUNT: 13 % (ref 11.5–14.5)
EST. AVERAGE GLUCOSE BLD GHB EST-MCNC: 146 MG/DL
GLOBULIN SER CALC-MCNC: 3.1 G/DL (ref 2–4)
GLUCOSE SERPL-MCNC: 167 MG/DL (ref 65–100)
GLUCOSE UR STRIP.AUTO-MCNC: >1000 MG/DL
HBA1C MFR BLD: 6.7 % (ref 4–5.6)
HCT VFR BLD AUTO: 46.4 % (ref 35–47)
HGB BLD-MCNC: 15.5 G/DL (ref 11.5–16)
HGB UR QL STRIP: NEGATIVE
HYALINE CASTS URNS QL MICRO: ABNORMAL /LPF (ref 0–2)
IMM GRANULOCYTES # BLD AUTO: 0.12 K/UL (ref 0–0.04)
IMM GRANULOCYTES NFR BLD AUTO: 1.5 % (ref 0–0.5)
KETONES UR QL STRIP.AUTO: NEGATIVE MG/DL
LEUKOCYTE ESTERASE UR QL STRIP.AUTO: NEGATIVE
LYMPHOCYTES # BLD: 2.78 K/UL (ref 0.8–3.5)
LYMPHOCYTES NFR BLD: 34.3 % (ref 12–49)
MCH RBC QN AUTO: 30.6 PG (ref 26–34)
MCHC RBC AUTO-ENTMCNC: 33.4 G/DL (ref 30–36.5)
MCV RBC AUTO: 91.7 FL (ref 80–99)
MONOCYTES # BLD: 0.63 K/UL (ref 0–1)
MONOCYTES NFR BLD: 7.8 % (ref 5–13)
NEUTS SEG # BLD: 4.28 K/UL (ref 1.8–8)
NEUTS SEG NFR BLD: 52.7 % (ref 32–75)
NITRITE UR QL STRIP.AUTO: NEGATIVE
NRBC # BLD: 0 K/UL (ref 0–0.01)
NRBC BLD-RTO: 0 PER 100 WBC
PH UR STRIP: 6 (ref 5–8)
PLATELET # BLD AUTO: 388 K/UL (ref 150–400)
PMV BLD AUTO: 9.4 FL (ref 8.9–12.9)
POTASSIUM SERPL-SCNC: 4.6 MMOL/L (ref 3.5–5.1)
PROT SERPL-MCNC: 7.3 G/DL (ref 6.4–8.2)
PROT UR STRIP-MCNC: NEGATIVE MG/DL
RBC # BLD AUTO: 5.06 M/UL (ref 3.8–5.2)
RBC #/AREA URNS HPF: ABNORMAL /HPF (ref 0–5)
SODIUM SERPL-SCNC: 139 MMOL/L (ref 136–145)
SP GR UR REFRACTOMETRY: 1.01 (ref 1–1.03)
URINE CULTURE IF INDICATED: ABNORMAL
UROBILINOGEN UR QL STRIP.AUTO: 0.2 EU/DL (ref 0.2–1)
WBC # BLD AUTO: 8.1 K/UL (ref 3.6–11)
WBC URNS QL MICRO: ABNORMAL /HPF (ref 0–4)

## 2025-04-30 PROCEDURE — 85025 COMPLETE CBC W/AUTO DIFF WBC: CPT

## 2025-04-30 PROCEDURE — 80053 COMPREHEN METABOLIC PANEL: CPT

## 2025-04-30 PROCEDURE — 83036 HEMOGLOBIN GLYCOSYLATED A1C: CPT

## 2025-04-30 PROCEDURE — 93005 ELECTROCARDIOGRAM TRACING: CPT | Performed by: ORTHOPAEDIC SURGERY

## 2025-04-30 PROCEDURE — 81001 URINALYSIS AUTO W/SCOPE: CPT

## 2025-04-30 PROCEDURE — 36415 COLL VENOUS BLD VENIPUNCTURE: CPT

## 2025-04-30 ASSESSMENT — PROMIS GLOBAL HEALTH SCALE
IN THE PAST 7 DAYS, HOW WOULD YOU RATE YOUR PAIN ON AVERAGE [ON A SCALE FROM 0 (NO PAIN) TO 10 (WORST IMAGINABLE PAIN)]?: 7
IN GENERAL, HOW WOULD YOU RATE YOUR SATISFACTION WITH YOUR SOCIAL ACTIVITIES AND RELATIONSHIPS [ON A SCALE OF 1 (POOR) TO 5 (EXCELLENT)]?: GOOD
IN GENERAL, HOW WOULD YOU RATE YOUR MENTAL HEALTH, INCLUDING YOUR MOOD AND YOUR ABILITY TO THINK [ON A SCALE OF 1 (POOR) TO 5 (EXCELLENT)]?: VERY GOOD
HOW IS THE PROMIS V1.1 BEING ADMINISTERED?: PAPER
IN GENERAL, PLEASE RATE HOW WELL YOU CARRY OUT YOUR USUAL SOCIAL ACTIVITIES (INCLUDES ACTIVITIES AT HOME, AT WORK, AND IN YOUR COMMUNITY, AND RESPONSIBILITIES AS A PARENT, CHILD, SPOUSE, EMPLOYEE, FRIEND, ETC) [ON A SCALE OF 1 (POOR) TO 5 (EXCELLENT)]?: GOOD
SUM OF RESPONSES TO QUESTIONS 3, 6, 7, & 8: 17
IN GENERAL, WOULD YOU SAY YOUR HEALTH IS...[ON A SCALE OF 1 (POOR) TO 5 (EXCELLENT)]: GOOD
SUM OF RESPONSES TO QUESTIONS 2, 4, 5, & 10: 15
IN GENERAL, HOW WOULD YOU RATE YOUR PHYSICAL HEALTH [ON A SCALE OF 1 (POOR) TO 5 (EXCELLENT)]?: GOOD
IN THE PAST 7 DAYS, HOW OFTEN HAVE YOU BEEN BOTHERED BY EMOTIONAL PROBLEMS, SUCH AS FEELING ANXIOUS, DEPRESSED, OR IRRITABLE [ON A SCALE FROM 1 (NEVER) TO 5 (ALWAYS)]?: NEVER
TO WHAT EXTENT ARE YOU ABLE TO CARRY OUT YOUR EVERYDAY PHYSICAL ACTIVITIES SUCH AS WALKING, CLIMBING STAIRS, CARRYING GROCERIES, OR MOVING A CHAIR [ON A SCALE OF 1 (NOT AT ALL) TO 5 (COMPLETELY)]?: A LITTLE
IN GENERAL, WOULD YOU SAY YOUR QUALITY OF LIFE IS...[ON A SCALE OF 1 (POOR) TO 5 (EXCELLENT)]: GOOD
WHO IS THE PERSON COMPLETING THE PROMIS V1.1 SURVEY?: SELF

## 2025-04-30 ASSESSMENT — PAIN DESCRIPTION - LOCATION: LOCATION: KNEE

## 2025-04-30 ASSESSMENT — KOOS JR
RISING FROM SITTING: SEVERE
BENDING TO THE FLOOR TO PICK UP OBJECT: MODERATE
STRAIGHTENING KNEE FULLY: MODERATE
STANDING UPRIGHT: SEVERE
KOOS JR TOTAL INTERVAL SCORE: 36.931
HOW SEVERE IS YOUR KNEE STIFFNESS AFTER FIRST WAKING IN MORNING: SEVERE
GOING UP OR DOWN STAIRS: SEVERE
TWISING OR PIVOTING ON KNEE: EXTREME

## 2025-04-30 ASSESSMENT — PAIN SCALES - GENERAL: PAINLEVEL_OUTOF10: 5

## 2025-04-30 ASSESSMENT — PAIN DESCRIPTION - DESCRIPTORS: DESCRIPTORS: ACHING

## 2025-04-30 ASSESSMENT — PAIN DESCRIPTION - ORIENTATION: ORIENTATION: RIGHT;LEFT

## 2025-04-30 NOTE — PERIOP NOTE
DOS:  05/13/2025 04/30/2025 @ 0951 - Spoke with Belen at Dr. Feroz Brian, cardiologist office.  Requested most recent office notes and test(s) to be faxed to PAT Dept.  Per Belen, PET scan done in 2024.

## 2025-04-30 NOTE — DISCHARGE INSTRUCTIONS
[facility]                   [address] [place] [address]   PRE-ADMISSION TESTING    [phone]      Surgery Date:  05/13/2025                  INSTRUCTIONS BEFORE YOUR SURGERY   Arrival Time [place] Pre-op staff will call you between 3 and 7pm the day before your surgery with your arrival time. If your surgery is on a Monday, they will call you the Friday before. If it's after 7pm the day prior to surgery and you have not received a time yet, please call [phone].   Where to Check In    Come through the Main Hospital entrance. Take the elevators on the left side of the lobby to the 2nd floor. The admitting desk will be on your right.     Please bring the following items to register:  photo ID, insurance card, co-pay if needed, medical directive, DNR, and/or POA if applicable.     Free  parking is available 7am to 5pm.   Food Drink Alcohol Marijuana    No food or drink (gum, mints, coffee, juice, etc) after midnight the night before surgery.      No alcohol (beer, wine, liquor) or marijuana 24 hours before or after surgery.           You may drink WATER ONLY up until 2 hours prior to your surgery time. Please do not      add anything to your water as this may result in your surgery being postponed.        If you are a diabetic, glucose tablets may be taken with water for low blood sugar if needed.   PRE-OP Medication Instructions      MEDICATIONS TO TAKE THE MORNING OF SURGERY:   Amlodipine  Levothyroxine         MEDICATIONS NOT TO TAKE THE EVENING BEFORE OR THE DAY OF SURGERY:  Fenofibrate  Losartan         SPECIAL INSTRUCTIONS:            Medications to STOP 7 Days Before Surgery Non-Steroidal anti-inflammatory Drugs (NSAID's): for example: Ibuprofen, Advil, Motrin, Naproxen, Aleve  Aspirin and Aspirin containing products (BC Powder, Excedrin, etc.)  Weight loss and/or diabetic GLP1 medications (Wegovy, Ozempic, Semaglutide, Trulicity, Mounjaro, Zepbound,

## 2025-04-30 NOTE — PERIOP NOTE
[facility]                   [address] [place] [address]   PRE-ADMISSION TESTING    [phone]     Surgery Date:  05/13/2025         INSTRUCTIONS BEFORE YOUR SURGERY   Arrival Time [place] Pre-op staff will call you between 3 and 7pm the day before your surgery with your arrival time. If your surgery is on a Monday, they will call you the Friday before. If it's after 7pm the day prior to surgery and you have not received a time yet, please call [phone].   Where to Check In   Come through the Main Hospital entrance. Take the elevators on the left side of the lobby to the 2nd floor. The admitting desk will be on your right.     Please bring the following items to register:  photo ID, insurance card, co-pay if needed, medical directive, DNR, and/or POA if applicable.     Free  parking is available 7am to 5pm.   Food Drink Alcohol Marijuana    No food or drink (gum, mints, coffee, juice, etc) after midnight the night before surgery.      No alcohol (beer, wine, liquor) or marijuana 24 hours before or after surgery.           You may drink WATER ONLY up until 2 hours prior to your surgery time. Please do not      add anything to your water as this may result in your surgery being postponed.       If you are a diabetic, glucose tablets may be taken with water for low blood sugar if needed.   PRE-OP Medication Instructions      MEDICATIONS TO TAKE THE MORNING OF SURGERY:   Amlodipine  Levothyroxine         MEDICATIONS NOT TO TAKE THE EVENING BEFORE OR THE DAY OF SURGERY:  Fenofibrate  Losartan         SPECIAL INSTRUCTIONS:         Medications to STOP 7 Days Before Surgery Non-Steroidal anti-inflammatory Drugs (NSAID's): for example: Ibuprofen, Advil, Motrin, Naproxen, Aleve  Aspirin and Aspirin containing products (BC Powder, Excedrin, etc.)  Weight loss and/or diabetic GLP1 medications (Wegovy, Ozempic, Semaglutide, Trulicity, Mounjaro, Zepbound, Tirzepatide,

## 2025-04-30 NOTE — PERIOP NOTE
Per patient's request, the following labs sent via EMR to patient's PCP:  UA w/reflex, CBC, CMP, A1C.

## 2025-05-01 LAB
BACTERIA SPEC CULT: NORMAL
BACTERIA SPEC CULT: NORMAL
EKG ATRIAL RATE: 64 BPM
EKG DIAGNOSIS: NORMAL
EKG P AXIS: 14 DEGREES
EKG P-R INTERVAL: 198 MS
EKG Q-T INTERVAL: 418 MS
EKG QRS DURATION: 94 MS
EKG QTC CALCULATION (BAZETT): 431 MS
EKG R AXIS: 31 DEGREES
EKG T AXIS: 57 DEGREES
EKG VENTRICULAR RATE: 64 BPM
SERVICE CMNT-IMP: NORMAL

## 2025-05-01 PROCEDURE — 93010 ELECTROCARDIOGRAM REPORT: CPT | Performed by: INTERNAL MEDICINE

## 2025-05-09 ENCOUNTER — ANESTHESIA EVENT (OUTPATIENT)
Facility: HOSPITAL | Age: 75
End: 2025-05-09
Payer: MEDICARE

## 2025-05-12 NOTE — DISCHARGE INSTRUCTIONS
Discharge Instructions after Total Knee Replacement  Eleuterio Tompkins MD  Direct Office phone number: [phone] to leave voicemail  After hours (weekdays after 5PM or on weekends): Please call [phone], and follow the prompts to reach the on call provider        Activity:  You may put full weight on your operated leg unless otherwise instructed  Work on getting the knee all the way straight, starting to bend it, and lifting it straight up off the bed.  Walk with a walker or two crutches for 2 weeks after surgery, then plan to go to a single point cane or single crutch for 2 weeks after that. The reason to walk with an assistive device is to avoid losing your balance and falling  Elevate your operated extremity (\"toes above nose\") throughout the day to decrease swelling and pain  Ice your operated knee multiple (3-4) times a day to decrease swelling and pain. Use a bag of ice or frozen vegetables inside a towel, placed onto the skin. This should be done for about 20-30 minutes at a time, with at least 20-30 minutes before the next icing session  Unless otherwise indicated, we will plan on starting physical therapy around 5-7 days after your surgery. You likely have been given a prescription for PT before the surgery or at the time of discharge from the hospital. If you did not, please contact our office.  Make sure you are doing PT as prescribed and doing home exercises to accelerate your rehabilitation    Physical Therapy:  You should typically begin physical therapy within 5-7 days after discharge from the hospital.   For the first 5-7 days after surgery, the initial goal is to be able to get the knee all the way straight and to begin bending. We want the inflammation from the surgery to decrease initially  Physical therapy is critical to success after your operation. You should have a prescription for PT that details the goals that I have for the physical therapist after surgery.   You should be attending

## 2025-05-12 NOTE — PERIOP NOTE
Hello,     You are scheduled to have surgery tomorrow at [facility].     We would like for you to arrive at  0530 am  We are located on the second floor, [address]. You will check-in at the registration desk located outside the elevators on the second floor prior to proceeding to [address].  Remember nothing to eat or drink after midnight. If you need to take medications the morning of surgery, please take with a few sips of water.   Wear loose, comfortable clothing and leave all your jewelry at home.   You may bring your cell phone with you.  One family member will be allowed in the pre-op area once you are dressed and your IV has been started.   You will need someone to drive you home and be with you for 24 hours post-anesthesia.     We look forward to seeing you! Call [phone] for questions after hours and [phone] between 5:30AM and 6PM.     Thanks!    [facility] ASU PREOP TEAM

## 2025-05-12 NOTE — ANESTHESIA PRE PROCEDURE
arthroscopy   • ORTHOPEDIC SURGERY Right 1990    Right Carpal tunnel repair   • ORTHOPEDIC SURGERY Left 1990    Left Hand carpal tunnel repair   • OTHER SURGICAL HISTORY      injections in lower back   • OTHER SURGICAL HISTORY Right     for skin cancer nose to upper lip   • SKIN BIOPSY     • SMALL INTESTINE SURGERY  2011   • WISDOM TOOTH EXTRACTION         Social History:    Social History     Tobacco Use   • Smoking status: Never   • Smokeless tobacco: Never   • Tobacco comments:     none   Substance Use Topics   • Alcohol use: No                                Counseling given: Not Answered  Tobacco comments: none      Vital Signs (Current): There were no vitals filed for this visit.                                           BP Readings from Last 3 Encounters:   04/30/25 (!) 142/68   12/11/24 (!) 128/53   06/10/24 (!) 170/69       NPO Status:                                                                                 BMI:   Wt Readings from Last 3 Encounters:   04/30/25 83.6 kg (184 lb 3.2 oz)   12/11/24 82.4 kg (181 lb 9.6 oz)   06/10/24 81.3 kg (179 lb 3.2 oz)     There is no height or weight on file to calculate BMI.    CBC:   Lab Results   Component Value Date/Time    WBC 8.1 04/30/2025 09:52 AM    RBC 5.06 04/30/2025 09:52 AM    HGB 15.5 04/30/2025 09:52 AM    HCT 46.4 04/30/2025 09:52 AM    MCV 91.7 04/30/2025 09:52 AM    RDW 13.0 04/30/2025 09:52 AM     04/30/2025 09:52 AM       CMP:   Lab Results   Component Value Date/Time     04/30/2025 09:52 AM    K 4.6 04/30/2025 09:52 AM     04/30/2025 09:52 AM    CO2 26 04/30/2025 09:52 AM    BUN 15 04/30/2025 09:52 AM    CREATININE 0.94 04/30/2025 09:52 AM    GFRAA >60 08/16/2022 11:34 AM    AGRATIO 1.2 08/16/2022 11:34 AM    LABGLOM 64 04/30/2025 09:52 AM    GLUCOSE 167 04/30/2025 09:52 AM    CALCIUM 10.2 04/30/2025 09:52 AM    BILITOT 0.5 04/30/2025 09:52 AM    ALKPHOS 78 04/30/2025 09:52 AM    ALKPHOS 81 08/16/2022 11:34 AM    AST 19

## 2025-05-13 ENCOUNTER — APPOINTMENT (OUTPATIENT)
Facility: HOSPITAL | Age: 75
End: 2025-05-13
Attending: ORTHOPAEDIC SURGERY
Payer: MEDICARE

## 2025-05-13 ENCOUNTER — ANESTHESIA (OUTPATIENT)
Facility: HOSPITAL | Age: 75
End: 2025-05-13
Payer: MEDICARE

## 2025-05-13 ENCOUNTER — HOSPITAL ENCOUNTER (OUTPATIENT)
Facility: HOSPITAL | Age: 75
Setting detail: OBSERVATION
Discharge: HOME | End: 2025-05-14
Attending: ORTHOPAEDIC SURGERY | Admitting: ORTHOPAEDIC SURGERY
Payer: MEDICARE

## 2025-05-13 DIAGNOSIS — Z96.651 STATUS POST TOTAL RIGHT KNEE REPLACEMENT: Primary | ICD-10-CM

## 2025-05-13 PROBLEM — Z96.659 TOTAL KNEE REPLACEMENT STATUS, UNSPECIFIED LATERALITY: Status: ACTIVE | Noted: 2025-05-13

## 2025-05-13 LAB
GLUCOSE BLD STRIP.AUTO-MCNC: 180 MG/DL (ref 65–117)
SERVICE CMNT-IMP: ABNORMAL

## 2025-05-13 PROCEDURE — 6360000002 HC RX W HCPCS: Performed by: ORTHOPAEDIC SURGERY

## 2025-05-13 PROCEDURE — 2580000003 HC RX 258: Performed by: NURSE ANESTHETIST, CERTIFIED REGISTERED

## 2025-05-13 PROCEDURE — 7100000000 HC PACU RECOVERY - FIRST 15 MIN: Performed by: ORTHOPAEDIC SURGERY

## 2025-05-13 PROCEDURE — 97161 PT EVAL LOW COMPLEX 20 MIN: CPT

## 2025-05-13 PROCEDURE — 6360000002 HC RX W HCPCS: Performed by: ANESTHESIOLOGY

## 2025-05-13 PROCEDURE — 2580000003 HC RX 258: Performed by: STUDENT IN AN ORGANIZED HEALTH CARE EDUCATION/TRAINING PROGRAM

## 2025-05-13 PROCEDURE — 6370000000 HC RX 637 (ALT 250 FOR IP): Performed by: STUDENT IN AN ORGANIZED HEALTH CARE EDUCATION/TRAINING PROGRAM

## 2025-05-13 PROCEDURE — 3600000014 HC SURGERY LEVEL 4 ADDTL 15MIN: Performed by: ORTHOPAEDIC SURGERY

## 2025-05-13 PROCEDURE — 3600000004 HC SURGERY LEVEL 4 BASE: Performed by: ORTHOPAEDIC SURGERY

## 2025-05-13 PROCEDURE — 2500000003 HC RX 250 WO HCPCS: Performed by: NURSE ANESTHETIST, CERTIFIED REGISTERED

## 2025-05-13 PROCEDURE — 97530 THERAPEUTIC ACTIVITIES: CPT

## 2025-05-13 PROCEDURE — 6370000000 HC RX 637 (ALT 250 FOR IP): Performed by: ORTHOPAEDIC SURGERY

## 2025-05-13 PROCEDURE — 97116 GAIT TRAINING THERAPY: CPT

## 2025-05-13 PROCEDURE — 6370000000 HC RX 637 (ALT 250 FOR IP): Performed by: ANESTHESIOLOGY

## 2025-05-13 PROCEDURE — G0378 HOSPITAL OBSERVATION PER HR: HCPCS

## 2025-05-13 PROCEDURE — 6360000002 HC RX W HCPCS: Performed by: NURSE ANESTHETIST, CERTIFIED REGISTERED

## 2025-05-13 PROCEDURE — 3700000001 HC ADD 15 MINUTES (ANESTHESIA): Performed by: ORTHOPAEDIC SURGERY

## 2025-05-13 PROCEDURE — 2500000003 HC RX 250 WO HCPCS: Performed by: STUDENT IN AN ORGANIZED HEALTH CARE EDUCATION/TRAINING PROGRAM

## 2025-05-13 PROCEDURE — 82962 GLUCOSE BLOOD TEST: CPT

## 2025-05-13 PROCEDURE — C1713 ANCHOR/SCREW BN/BN,TIS/BN: HCPCS | Performed by: ORTHOPAEDIC SURGERY

## 2025-05-13 PROCEDURE — 6360000002 HC RX W HCPCS: Performed by: STUDENT IN AN ORGANIZED HEALTH CARE EDUCATION/TRAINING PROGRAM

## 2025-05-13 PROCEDURE — C1776 JOINT DEVICE (IMPLANTABLE): HCPCS | Performed by: ORTHOPAEDIC SURGERY

## 2025-05-13 PROCEDURE — 7100000001 HC PACU RECOVERY - ADDTL 15 MIN: Performed by: ORTHOPAEDIC SURGERY

## 2025-05-13 PROCEDURE — 3700000000 HC ANESTHESIA ATTENDED CARE: Performed by: ORTHOPAEDIC SURGERY

## 2025-05-13 PROCEDURE — 2720000010 HC SURG SUPPLY STERILE: Performed by: ORTHOPAEDIC SURGERY

## 2025-05-13 PROCEDURE — 73560 X-RAY EXAM OF KNEE 1 OR 2: CPT

## 2025-05-13 PROCEDURE — 64447 NJX AA&/STRD FEMORAL NRV IMG: CPT | Performed by: ANESTHESIOLOGY

## 2025-05-13 PROCEDURE — 2709999900 HC NON-CHARGEABLE SUPPLY: Performed by: ORTHOPAEDIC SURGERY

## 2025-05-13 PROCEDURE — APPNB30 APP NON BILLABLE TIME 0-30 MINS: Performed by: NURSE PRACTITIONER

## 2025-05-13 DEVICE — ATTUNE KNEE SYSTEM TIBIAL INSERT FIXED BEARING MEDIAL STABILIZED RIGHT AOX 3, 5MM
Type: IMPLANTABLE DEVICE | Site: KNEE | Status: FUNCTIONAL
Brand: ATTUNE

## 2025-05-13 DEVICE — CEMENT BNE 40GM FULL DOSE PMMA W/ GENT HI VISC SIMPLEX P 10: Type: IMPLANTABLE DEVICE | Site: KNEE | Status: FUNCTIONAL

## 2025-05-13 DEVICE — ATTUNE KNEE SYSTEM FEMORAL CRUCIATE RETAINING SIZE 3 RIGHT CEMENTED
Type: IMPLANTABLE DEVICE | Site: KNEE | Status: FUNCTIONAL
Brand: ATTUNE

## 2025-05-13 DEVICE — ATTUNE KNEE SYSTEM TIBIAL BASE FIXED BEARING SIZE 2 CEMENTED
Type: IMPLANTABLE DEVICE | Site: KNEE | Status: FUNCTIONAL
Brand: ATTUNE

## 2025-05-13 DEVICE — ATTUNE PATELLA MEDIALIZED DOME 32MM CEMENTED AOX
Type: IMPLANTABLE DEVICE | Site: KNEE | Status: FUNCTIONAL
Brand: ATTUNE

## 2025-05-13 DEVICE — KNEE K1 TOT HEMI STD CEM IMPL CAPPED SYNTHES: Type: IMPLANTABLE DEVICE | Site: KNEE | Status: FUNCTIONAL

## 2025-05-13 RX ORDER — DEXAMETHASONE SODIUM PHOSPHATE 4 MG/ML
INJECTION, SOLUTION INTRA-ARTICULAR; INTRALESIONAL; INTRAMUSCULAR; INTRAVENOUS; SOFT TISSUE
Status: DISCONTINUED | OUTPATIENT
Start: 2025-05-13 | End: 2025-05-13 | Stop reason: SDUPTHER

## 2025-05-13 RX ORDER — BUPIVACAINE HYDROCHLORIDE 5 MG/ML
INJECTION, SOLUTION EPIDURAL; INTRACAUDAL; PERINEURAL
Status: DISCONTINUED | OUTPATIENT
Start: 2025-05-13 | End: 2025-05-13 | Stop reason: SDUPTHER

## 2025-05-13 RX ORDER — SODIUM CHLORIDE 0.9 % (FLUSH) 0.9 %
5-40 SYRINGE (ML) INJECTION EVERY 12 HOURS SCHEDULED
Status: DISCONTINUED | OUTPATIENT
Start: 2025-05-13 | End: 2025-05-14 | Stop reason: HOSPADM

## 2025-05-13 RX ORDER — PROPOFOL 10 MG/ML
INJECTION, EMULSION INTRAVENOUS
Status: DISCONTINUED | OUTPATIENT
Start: 2025-05-13 | End: 2025-05-13 | Stop reason: SDUPTHER

## 2025-05-13 RX ORDER — BUPIVACAINE HYDROCHLORIDE 2.5 MG/ML
INJECTION, SOLUTION EPIDURAL; INFILTRATION; INTRACAUDAL; PERINEURAL
Status: DISCONTINUED | OUTPATIENT
Start: 2025-05-13 | End: 2025-05-13 | Stop reason: SDUPTHER

## 2025-05-13 RX ORDER — PHENYLEPHRINE HCL IN 0.9% NACL 0.4MG/10ML
SYRINGE (ML) INTRAVENOUS
Status: DISCONTINUED | OUTPATIENT
Start: 2025-05-13 | End: 2025-05-13 | Stop reason: SDUPTHER

## 2025-05-13 RX ORDER — PROMETHAZINE HYDROCHLORIDE 25 MG/1
12.5 TABLET ORAL EVERY 6 HOURS PRN
Status: DISCONTINUED | OUTPATIENT
Start: 2025-05-13 | End: 2025-05-14 | Stop reason: HOSPADM

## 2025-05-13 RX ORDER — ASPIRIN 81 MG/1
81 TABLET, CHEWABLE ORAL 2 TIMES DAILY WITH MEALS
Qty: 60 TABLET | Refills: 0 | Status: SHIPPED | OUTPATIENT
Start: 2025-05-13 | End: 2025-06-12

## 2025-05-13 RX ORDER — SODIUM CHLORIDE, SODIUM LACTATE, POTASSIUM CHLORIDE, CALCIUM CHLORIDE 600; 310; 30; 20 MG/100ML; MG/100ML; MG/100ML; MG/100ML
INJECTION, SOLUTION INTRAVENOUS CONTINUOUS
Status: DISCONTINUED | OUTPATIENT
Start: 2025-05-13 | End: 2025-05-13 | Stop reason: HOSPADM

## 2025-05-13 RX ORDER — FENTANYL CITRATE 50 UG/ML
100 INJECTION, SOLUTION INTRAMUSCULAR; INTRAVENOUS
Status: DISCONTINUED | OUTPATIENT
Start: 2025-05-13 | End: 2025-05-13 | Stop reason: HOSPADM

## 2025-05-13 RX ORDER — OXYCODONE HYDROCHLORIDE 5 MG/1
10 TABLET ORAL
Status: DISCONTINUED | OUTPATIENT
Start: 2025-05-13 | End: 2025-05-13 | Stop reason: HOSPADM

## 2025-05-13 RX ORDER — FENTANYL CITRATE 50 UG/ML
25 INJECTION, SOLUTION INTRAMUSCULAR; INTRAVENOUS EVERY 5 MIN PRN
Status: DISCONTINUED | OUTPATIENT
Start: 2025-05-13 | End: 2025-05-13 | Stop reason: SDUPTHER

## 2025-05-13 RX ORDER — HYDROXYZINE HYDROCHLORIDE 10 MG/1
10 TABLET, FILM COATED ORAL EVERY 8 HOURS PRN
Status: DISCONTINUED | OUTPATIENT
Start: 2025-05-13 | End: 2025-05-14 | Stop reason: HOSPADM

## 2025-05-13 RX ORDER — ACETAMINOPHEN 325 MG/1
975 TABLET ORAL ONCE
Status: DISCONTINUED | OUTPATIENT
Start: 2025-05-13 | End: 2025-05-13 | Stop reason: SDUPTHER

## 2025-05-13 RX ORDER — OXYCODONE HYDROCHLORIDE 5 MG/1
5 TABLET ORAL EVERY 4 HOURS PRN
Qty: 42 TABLET | Refills: 0 | Status: SHIPPED | OUTPATIENT
Start: 2025-05-13 | End: 2025-05-20

## 2025-05-13 RX ORDER — SENNOSIDES 8.6 MG
650 CAPSULE ORAL EVERY 8 HOURS PRN
Qty: 30 TABLET | Refills: 0 | Status: SHIPPED | OUTPATIENT
Start: 2025-05-13 | End: 2025-06-12

## 2025-05-13 RX ORDER — ACETAMINOPHEN 325 MG/1
975 TABLET ORAL ONCE
Status: COMPLETED | OUTPATIENT
Start: 2025-05-13 | End: 2025-05-13

## 2025-05-13 RX ORDER — FENTANYL CITRATE 50 UG/ML
INJECTION, SOLUTION INTRAMUSCULAR; INTRAVENOUS
Status: DISCONTINUED | OUTPATIENT
Start: 2025-05-13 | End: 2025-05-13 | Stop reason: SDUPTHER

## 2025-05-13 RX ORDER — CELECOXIB 200 MG/1
200 CAPSULE ORAL DAILY
Qty: 60 CAPSULE | Refills: 3 | Status: SHIPPED | OUTPATIENT
Start: 2025-05-13

## 2025-05-13 RX ORDER — OXYCODONE HYDROCHLORIDE 5 MG/1
5 TABLET ORAL
Status: DISCONTINUED | OUTPATIENT
Start: 2025-05-13 | End: 2025-05-13 | Stop reason: HOSPADM

## 2025-05-13 RX ORDER — MIDAZOLAM HYDROCHLORIDE 2 MG/2ML
2 INJECTION, SOLUTION INTRAMUSCULAR; INTRAVENOUS
Status: DISCONTINUED | OUTPATIENT
Start: 2025-05-13 | End: 2025-05-13 | Stop reason: HOSPADM

## 2025-05-13 RX ORDER — ASPIRIN 81 MG/1
81 TABLET ORAL 2 TIMES DAILY
Status: DISCONTINUED | OUTPATIENT
Start: 2025-05-13 | End: 2025-05-14 | Stop reason: HOSPADM

## 2025-05-13 RX ORDER — SODIUM CHLORIDE, SODIUM LACTATE, POTASSIUM CHLORIDE, CALCIUM CHLORIDE 600; 310; 30; 20 MG/100ML; MG/100ML; MG/100ML; MG/100ML
INJECTION, SOLUTION INTRAVENOUS
Status: DISCONTINUED | OUTPATIENT
Start: 2025-05-13 | End: 2025-05-13 | Stop reason: SDUPTHER

## 2025-05-13 RX ORDER — DIPHENHYDRAMINE HYDROCHLORIDE 50 MG/ML
12.5 INJECTION, SOLUTION INTRAMUSCULAR; INTRAVENOUS
Status: DISCONTINUED | OUTPATIENT
Start: 2025-05-13 | End: 2025-05-13 | Stop reason: HOSPADM

## 2025-05-13 RX ORDER — OXYCODONE HYDROCHLORIDE 5 MG/1
10 TABLET ORAL EVERY 4 HOURS PRN
Status: DISCONTINUED | OUTPATIENT
Start: 2025-05-13 | End: 2025-05-14 | Stop reason: HOSPADM

## 2025-05-13 RX ORDER — ASPIRIN 81 MG/1
81 TABLET ORAL 2 TIMES DAILY
Status: DISCONTINUED | OUTPATIENT
Start: 2025-05-13 | End: 2025-05-13

## 2025-05-13 RX ORDER — POLYETHYLENE GLYCOL 3350 17 G/17G
17 POWDER, FOR SOLUTION ORAL DAILY
Status: DISCONTINUED | OUTPATIENT
Start: 2025-05-13 | End: 2025-05-14 | Stop reason: HOSPADM

## 2025-05-13 RX ORDER — VANCOMYCIN HYDROCHLORIDE 1 G/20ML
INJECTION, POWDER, LYOPHILIZED, FOR SOLUTION INTRAVENOUS PRN
Status: DISCONTINUED | OUTPATIENT
Start: 2025-05-13 | End: 2025-05-13 | Stop reason: HOSPADM

## 2025-05-13 RX ORDER — ONDANSETRON 2 MG/ML
4 INJECTION INTRAMUSCULAR; INTRAVENOUS EVERY 6 HOURS PRN
Status: DISCONTINUED | OUTPATIENT
Start: 2025-05-13 | End: 2025-05-14 | Stop reason: HOSPADM

## 2025-05-13 RX ORDER — SODIUM CHLORIDE, SODIUM LACTATE, POTASSIUM CHLORIDE, CALCIUM CHLORIDE 600; 310; 30; 20 MG/100ML; MG/100ML; MG/100ML; MG/100ML
INJECTION, SOLUTION INTRAVENOUS CONTINUOUS
Status: DISCONTINUED | OUTPATIENT
Start: 2025-05-13 | End: 2025-05-14 | Stop reason: HOSPADM

## 2025-05-13 RX ORDER — SODIUM CHLORIDE 9 MG/ML
INJECTION, SOLUTION INTRAVENOUS PRN
Status: DISCONTINUED | OUTPATIENT
Start: 2025-05-13 | End: 2025-05-14 | Stop reason: HOSPADM

## 2025-05-13 RX ORDER — OXYCODONE HYDROCHLORIDE 5 MG/1
5 TABLET ORAL
Status: DISCONTINUED | OUTPATIENT
Start: 2025-05-13 | End: 2025-05-13 | Stop reason: SDUPTHER

## 2025-05-13 RX ORDER — SODIUM CHLORIDE 0.9 % (FLUSH) 0.9 %
5-40 SYRINGE (ML) INJECTION PRN
Status: DISCONTINUED | OUTPATIENT
Start: 2025-05-13 | End: 2025-05-14 | Stop reason: HOSPADM

## 2025-05-13 RX ORDER — ACETAMINOPHEN 500 MG
1000 TABLET ORAL EVERY 8 HOURS SCHEDULED
Status: DISCONTINUED | OUTPATIENT
Start: 2025-05-13 | End: 2025-05-14 | Stop reason: HOSPADM

## 2025-05-13 RX ORDER — TRANEXAMIC ACID 100 MG/ML
INJECTION, SOLUTION INTRAVENOUS
Status: DISCONTINUED | OUTPATIENT
Start: 2025-05-13 | End: 2025-05-13 | Stop reason: SDUPTHER

## 2025-05-13 RX ORDER — OXYCODONE HCL 10 MG/1
10 TABLET, FILM COATED, EXTENDED RELEASE ORAL ONCE
Status: COMPLETED | OUTPATIENT
Start: 2025-05-13 | End: 2025-05-13

## 2025-05-13 RX ORDER — POLYETHYLENE GLYCOL 3350 17 G/17G
17 POWDER, FOR SOLUTION ORAL DAILY PRN
Qty: 14 PACKET | Refills: 0 | Status: SHIPPED | OUTPATIENT
Start: 2025-05-13 | End: 2025-05-27

## 2025-05-13 RX ORDER — ONDANSETRON 2 MG/ML
4 INJECTION INTRAMUSCULAR; INTRAVENOUS
Status: COMPLETED | OUTPATIENT
Start: 2025-05-13 | End: 2025-05-13

## 2025-05-13 RX ORDER — MIDAZOLAM HYDROCHLORIDE 1 MG/ML
INJECTION, SOLUTION INTRAMUSCULAR; INTRAVENOUS
Status: DISCONTINUED | OUTPATIENT
Start: 2025-05-13 | End: 2025-05-13 | Stop reason: SDUPTHER

## 2025-05-13 RX ORDER — CEFADROXIL 500 MG/1
500 CAPSULE ORAL 2 TIMES DAILY
Qty: 6 CAPSULE | Refills: 0 | Status: SHIPPED | OUTPATIENT
Start: 2025-05-13 | End: 2025-05-16

## 2025-05-13 RX ORDER — EPHEDRINE SULFATE/0.9% NACL/PF 25 MG/5 ML
SYRINGE (ML) INTRAVENOUS
Status: DISCONTINUED | OUTPATIENT
Start: 2025-05-13 | End: 2025-05-13 | Stop reason: SDUPTHER

## 2025-05-13 RX ORDER — ONDANSETRON 4 MG/1
4 TABLET, ORALLY DISINTEGRATING ORAL EVERY 8 HOURS PRN
Qty: 10 TABLET | Refills: 0 | Status: SHIPPED | OUTPATIENT
Start: 2025-05-13

## 2025-05-13 RX ORDER — FENTANYL CITRATE 50 UG/ML
25 INJECTION, SOLUTION INTRAMUSCULAR; INTRAVENOUS EVERY 5 MIN PRN
Status: DISCONTINUED | OUTPATIENT
Start: 2025-05-13 | End: 2025-05-13 | Stop reason: HOSPADM

## 2025-05-13 RX ORDER — MAGNESIUM HYDROXIDE/ALUMINUM HYDROXICE/SIMETHICONE 120; 1200; 1200 MG/30ML; MG/30ML; MG/30ML
15 SUSPENSION ORAL EVERY 6 HOURS PRN
Status: DISCONTINUED | OUTPATIENT
Start: 2025-05-13 | End: 2025-05-14 | Stop reason: HOSPADM

## 2025-05-13 RX ORDER — NALOXONE HYDROCHLORIDE 0.4 MG/ML
INJECTION, SOLUTION INTRAMUSCULAR; INTRAVENOUS; SUBCUTANEOUS PRN
Status: DISCONTINUED | OUTPATIENT
Start: 2025-05-13 | End: 2025-05-13 | Stop reason: HOSPADM

## 2025-05-13 RX ORDER — OXYCODONE HYDROCHLORIDE 5 MG/1
5 TABLET ORAL EVERY 4 HOURS PRN
Status: DISCONTINUED | OUTPATIENT
Start: 2025-05-13 | End: 2025-05-14 | Stop reason: HOSPADM

## 2025-05-13 RX ORDER — LIDOCAINE HYDROCHLORIDE 10 MG/ML
1 INJECTION, SOLUTION EPIDURAL; INFILTRATION; INTRACAUDAL; PERINEURAL
Status: DISCONTINUED | OUTPATIENT
Start: 2025-05-13 | End: 2025-05-13 | Stop reason: HOSPADM

## 2025-05-13 RX ADMIN — EPHEDRINE SULFATE 5 MG: 5 INJECTION INTRAVENOUS at 07:58

## 2025-05-13 RX ADMIN — SODIUM CHLORIDE, POTASSIUM CHLORIDE, SODIUM LACTATE AND CALCIUM CHLORIDE: 600; 310; 30; 20 INJECTION, SOLUTION INTRAVENOUS at 09:12

## 2025-05-13 RX ADMIN — MIDAZOLAM HYDROCHLORIDE 2 MG: 1 INJECTION, SOLUTION INTRAMUSCULAR; INTRAVENOUS at 07:01

## 2025-05-13 RX ADMIN — Medication 80 MCG: at 08:33

## 2025-05-13 RX ADMIN — SODIUM CHLORIDE, SODIUM LACTATE, POTASSIUM CHLORIDE, AND CALCIUM CHLORIDE: .6; .31; .03; .02 INJECTION, SOLUTION INTRAVENOUS at 16:59

## 2025-05-13 RX ADMIN — Medication 10 MG: at 07:25

## 2025-05-13 RX ADMIN — EPHEDRINE SULFATE 5 MG: 5 INJECTION INTRAVENOUS at 07:53

## 2025-05-13 RX ADMIN — ONDANSETRON 4 MG: 2 INJECTION, SOLUTION INTRAMUSCULAR; INTRAVENOUS at 11:11

## 2025-05-13 RX ADMIN — BUPIVACAINE HYDROCHLORIDE 20 ML: 2.5 INJECTION, SOLUTION EPIDURAL; INFILTRATION; INTRACAUDAL; PERINEURAL at 07:07

## 2025-05-13 RX ADMIN — ACETAMINOPHEN 975 MG: 325 TABLET ORAL at 06:07

## 2025-05-13 RX ADMIN — ACETAMINOPHEN 1000 MG: 500 TABLET ORAL at 13:58

## 2025-05-13 RX ADMIN — BUPIVACAINE HYDROCHLORIDE 2 ML: 5 INJECTION, SOLUTION EPIDURAL; INTRACAUDAL; PERINEURAL at 07:23

## 2025-05-13 RX ADMIN — SODIUM CHLORIDE, SODIUM LACTATE, POTASSIUM CHLORIDE, AND CALCIUM CHLORIDE: .6; .31; .03; .02 INJECTION, SOLUTION INTRAVENOUS at 14:02

## 2025-05-13 RX ADMIN — EPHEDRINE SULFATE 5 MG: 5 INJECTION INTRAVENOUS at 08:14

## 2025-05-13 RX ADMIN — OXYCODONE HYDROCHLORIDE 10 MG: 10 TABLET, FILM COATED, EXTENDED RELEASE ORAL at 06:07

## 2025-05-13 RX ADMIN — SODIUM CHLORIDE, POTASSIUM CHLORIDE, SODIUM LACTATE AND CALCIUM CHLORIDE: 600; 310; 30; 20 INJECTION, SOLUTION INTRAVENOUS at 06:33

## 2025-05-13 RX ADMIN — HYDROMORPHONE HYDROCHLORIDE 0.5 MG: 1 INJECTION, SOLUTION INTRAMUSCULAR; INTRAVENOUS; SUBCUTANEOUS at 11:50

## 2025-05-13 RX ADMIN — PROPOFOL 17 MG: 10 INJECTION, EMULSION INTRAVENOUS at 07:35

## 2025-05-13 RX ADMIN — HYDROMORPHONE HYDROCHLORIDE 0.5 MG: 1 INJECTION, SOLUTION INTRAMUSCULAR; INTRAVENOUS; SUBCUTANEOUS at 11:11

## 2025-05-13 RX ADMIN — OXYCODONE 10 MG: 5 TABLET ORAL at 10:35

## 2025-05-13 RX ADMIN — BUPIVACAINE HYDROCHLORIDE 20 ML: 2.5 INJECTION, SOLUTION EPIDURAL; INFILTRATION; INTRACAUDAL; PERINEURAL at 07:04

## 2025-05-13 RX ADMIN — Medication 10 MG: at 08:36

## 2025-05-13 RX ADMIN — Medication 10 MG: at 07:33

## 2025-05-13 RX ADMIN — Medication 10 MG: at 07:59

## 2025-05-13 RX ADMIN — WATER 2000 MG: 1 INJECTION INTRAMUSCULAR; INTRAVENOUS; SUBCUTANEOUS at 16:52

## 2025-05-13 RX ADMIN — EPHEDRINE SULFATE 10 MG: 5 INJECTION INTRAVENOUS at 07:45

## 2025-05-13 RX ADMIN — PROPOFOL 17 MG: 10 INJECTION, EMULSION INTRAVENOUS at 08:36

## 2025-05-13 RX ADMIN — PROPOFOL 20 MG: 10 INJECTION, EMULSION INTRAVENOUS at 07:30

## 2025-05-13 RX ADMIN — PROPOFOL 50 MCG/KG/MIN: 10 INJECTION, EMULSION INTRAVENOUS at 07:31

## 2025-05-13 RX ADMIN — DEXAMETHASONE SODIUM PHOSPHATE 8 MG: 4 INJECTION, SOLUTION INTRAMUSCULAR; INTRAVENOUS at 07:24

## 2025-05-13 RX ADMIN — PROPOFOL 17 MG: 10 INJECTION, EMULSION INTRAVENOUS at 08:37

## 2025-05-13 RX ADMIN — Medication 80 MCG: at 08:14

## 2025-05-13 RX ADMIN — Medication 80 MCG: at 07:59

## 2025-05-13 RX ADMIN — FENTANYL CITRATE 100 MCG: 50 INJECTION, SOLUTION INTRAMUSCULAR; INTRAVENOUS at 07:01

## 2025-05-13 RX ADMIN — TRANEXAMIC ACID 1000 MG: 100 INJECTION, SOLUTION INTRAVENOUS at 07:24

## 2025-05-13 RX ADMIN — ASPIRIN 81 MG: 81 TABLET, COATED ORAL at 19:20

## 2025-05-13 ASSESSMENT — PAIN DESCRIPTION - DESCRIPTORS
DESCRIPTORS: ACHING
DESCRIPTORS: SPASM
DESCRIPTORS: ACHING
DESCRIPTORS: ACHING;SORE

## 2025-05-13 ASSESSMENT — PAIN DESCRIPTION - PAIN TYPE
TYPE: SURGICAL PAIN
TYPE: CHRONIC PAIN
TYPE: SURGICAL PAIN

## 2025-05-13 ASSESSMENT — PAIN SCALES - GENERAL
PAINLEVEL_OUTOF10: 4
PAINLEVEL_OUTOF10: 6
PAINLEVEL_OUTOF10: 9
PAINLEVEL_OUTOF10: 5
PAINLEVEL_OUTOF10: 4
PAINLEVEL_OUTOF10: 6
PAINLEVEL_OUTOF10: 0
PAINLEVEL_OUTOF10: 6
PAINLEVEL_OUTOF10: 4

## 2025-05-13 ASSESSMENT — PAIN DESCRIPTION - ORIENTATION
ORIENTATION: RIGHT
ORIENTATION: LEFT
ORIENTATION: RIGHT;LEFT
ORIENTATION: RIGHT

## 2025-05-13 ASSESSMENT — PAIN DESCRIPTION - LOCATION
LOCATION: KNEE
LOCATION: LEG

## 2025-05-13 ASSESSMENT — PAIN DESCRIPTION - FREQUENCY
FREQUENCY: CONTINUOUS

## 2025-05-13 ASSESSMENT — PAIN - FUNCTIONAL ASSESSMENT
PAIN_FUNCTIONAL_ASSESSMENT: 0-10
PAIN_FUNCTIONAL_ASSESSMENT: ACTIVITIES ARE NOT PREVENTED

## 2025-05-13 NOTE — ANESTHESIA PROCEDURE NOTES
Peripheral Block    Patient location during procedure: pre-op  Reason for block: procedure for pain, post-op pain management, primary anesthetic and at surgeon's request  Start time: 5/13/2025 7:01 AM  End time: 5/13/2025 7:07 AM  Staffing  Performed: anesthesiologist   Anesthesiologist: Feroz Gordon MD  Performed by: Feroz Gordon MD  Authorized by: Feroz Gordon MD    Preanesthetic Checklist  Completed: patient identified, IV checked, site marked, risks and benefits discussed, surgical/procedural consents, timeout performed, anesthesia consent given, oxygen available and monitors applied/VS acknowledged  Peripheral Block   Patient position: supine  Prep: ChloraPrep  Provider prep: mask and sterile gloves  Patient monitoring: cardiac monitor, continuous pulse ox, continuous capnometry, frequent blood pressure checks, IV access, oxygen and responsive to questions  Block type: Femoral and iPacks  Laterality: right  Injection technique: single-shot  Guidance: ultrasound guided    Needle   Needle type: Other   Needle gauge: 21 G  Needle localization: ultrasound guidance  Needle length: 10 cmOther needle type: STIMUPLEX  Assessment   Injection assessment: negative aspiration for heme, no paresthesia on injection, local visualized surrounding nerve on ultrasound and no intravascular symptoms  Hemodynamics: stable  Outcomes: patient tolerated procedure well    Additional Notes  IPACK block performed with landmark identification. Needle used was 4\" stimuplex 21g 20cc 0.25% bupivacaine injected intermittently with negative aspiration.     Imelda YANEZ witnessed timeout and block written on correct side.

## 2025-05-13 NOTE — PERIOP NOTE
4th floor notified of room assignment to 405. Receiving nurse to call back for report in 20 minutes.

## 2025-05-13 NOTE — CARE COORDINATION
5/13/2025  3:30 PM  Care Management Progress Note    Reason for Admission:   Primary osteoarthritis of right knee [M17.11]  Total knee replacement status, unspecified laterality [Z96.659]  Procedure(s) (LRB):  RIGHT TOTAL KNEE REPLACEMENT (SPINAL WITH REGIONAL BLOCK) (FAST TRACK) (Right)  * Day of Surgery *    Patient Admission Status: Observation  Date Admitted to INP:  [x]NA - OBS/Outpatient  RUR: No data recorded  Hospitalization in the last 30 days (Readmission):  No        Transition of care plan:  Discharge pending therapy clearance.  Home with OP PT - CM confirmed pt is scheduled for OP PT, and has a RW per therapy.  Date IM given: [x]NA  Outpatient follow-up.  Discharge transport: Family

## 2025-05-13 NOTE — ANESTHESIA POSTPROCEDURE EVALUATION
Department of Anesthesiology  Postprocedure Note    Patient: Ashanti Cedeno  MRN: [id number]  YOB: 1950  Date of evaluation: 5/13/2025    Procedure Summary       Date: 05/13/25 Room / Location: [facility] ASU OR  / [facility] AMBULATORY OR    Anesthesia Start: 0718 Anesthesia Stop: 0910    Procedure: RIGHT TOTAL KNEE REPLACEMENT (SPINAL WITH REGIONAL BLOCK) (FAST TRACK) (Right: Knee) Diagnosis:       Primary osteoarthritis of right knee      (Primary osteoarthritis of right knee [M17.11])    Surgeons: Eleuterio Tompkins MD Responsible Provider: Feroz Gordon MD    Anesthesia Type: Spinal ASA Status: 3            Anesthesia Type: Spinal    Bridget Phase I: Bridget Score: 10    Bridget Phase II:      Anesthesia Post Evaluation    Patient location during evaluation: PACU  Patient participation: complete - patient participated  Level of consciousness: awake  Pain score: 0  Airway patency: patent  Nausea & Vomiting: no nausea and no vomiting  Cardiovascular status: blood pressure returned to baseline  Respiratory status: acceptable  Hydration status: euvolemic  Multimodal analgesia pain management approach  Pain management: adequate    No notable events documented.

## 2025-05-13 NOTE — PERIOP NOTE
Patient unable to ambulate with physical therapy and no longer Fast Track candidate. Discharge order removed and patient will be admitted to 4th floor.

## 2025-05-13 NOTE — H&P
Orthopaedic PRE-OP Admission History and Physical        Subjective:   Patient is a 74 y.o.  female who presented for right knee pain.  The patient was evaluated and determined the most appropriate plan of care is to proceed with surgical intervention. Conservative measures were not indicated or successful.         Patient Active Problem List    Diagnosis Date Noted    Examination of participant in clinical trial 02/09/2024    Sleep apnea 01/26/2019    KIM (nonalcoholic steatohepatitis) 01/25/2019    Hypothyroidism 01/25/2019    Diabetes mellitus, type 2 (HCC) 01/25/2019    Hypertension 01/25/2019    S/P DALLAS (total abdominal hysterectomy) 01/25/2019    H/O carpal tunnel repair 01/25/2019    Kidney stones 01/25/2019    History of cholecystectomy 01/25/2019    H/O total knee replacement 08/26/2013     Past Medical History:   Diagnosis Date    Chronic kidney disease, stage 2 (mild)     Chronic pain 01/01/2012    Left Knee x10-12 weeks    Colon polyp     Cyst of pancreas     Diabetes mellitus, type II (HCC)     Diverticulitis     GERD (gastroesophageal reflux disease)     Hepatic fibrosis     HLD (hyperlipidemia)     Hypertension 01/01/2000    Hypothyroidism     Kidney stones     KIM (nonalcoholic steatohepatitis)     Obesity     Occlusion and stenosis of bilateral carotid arteries     CATHERINE on CPAP     Other seborrheic keratosis     PONV (postoperative nausea and vomiting)     Primary osteoarthritis of right knee     Renal calculi     Skin cancer     located on right upper side of lip & under nose    Spinal stenosis of lumbar region with neurogenic claudication     Spondylolisthesis, lumbar region     Teeth missing     upper front bridge - not removable    Xerosis cutis       Past Surgical History:   Procedure Laterality Date    APPENDECTOMY  1993    BIOPSY LIVER      COLONOSCOPY      GYN  1993    Bilateral Biopsy on breasts    HYSTERECTOMY (CERVIX STATUS UNKNOWN)  1993    JOINT REPLACEMENT Left 2014    knee    LIVER

## 2025-05-13 NOTE — OP NOTE
OPERATIVE REPORT    FACILITY: [facility]    PATIENT NAME: Ashanti Cedeno     DATE OF OPERATION: 5/13/25    PREOPERATIVE DIAGNOSIS: Right knee arthritis    POSTOPERATIVE DIAGNOSIS: same    OPERATIVE PROCEDURE:   1. Right total knee arthroplasty, CPT 87248    ATTENDING PHYSICIAN: Eleuterio Tompkins MD    ASSISTANT: SPENCER Cleveland    JUSTIFICATION FOR SURGICAL ASSISTANT:   Please note that the surgical assistant listed above (SPENCER Cleveland) was required and necessary for the completion of this case to assist with limb positioning, soft tissue retraction, equipment management, implant insertion, and wound closure.     IMPLANTS:    Implant Name Type Inv. Item Serial No.  Lot No. LRB No. Used Action   CEMENT BNE 40GM FULL DOSE PMMA W/ GENT HI VISC SIMPLEX P 10 - SNA  CEMENT BNE 40GM FULL DOSE PMMA W/ GENT HI VISC SIMPLEX P 10 NA [employer] [facility] [id number] Right 2 Implanted   BASEPLATE TIB SZ 2 FIX BEAR JUAN ATTUNE - SN/A  BASEPLATE TIB SZ 2 FIX BEAR JUAN ATTUNE N/A [facility] [employer] [facility] [id number] Right 1 Implanted   COMPONENT FEM SZ 3 R CRUCE RET JUAN ATTUNE - SN/A  COMPONENT FEM SZ 3 R CRUCE RET JUAN ATTUNE N/A [facility] [id number] Right 1 Implanted   COMPONENT PAT [id number] POLYETH DOME JUAN MEDIALIZED ATTUNE - SN/A  COMPONENT PAT [id number] POLYETH DOME JUAN MEDIALIZED ATTUNE N/A [employer] [facility] [id number] Right 1 Implanted   INSERT TIB FIX BEAR 3 5 MM RT MEDL KNEE STBL AOX ATTUNE - SN/A  INSERT TIB FIX BEAR 3 5 MM RT MEDL KNEE STBL AOX ATTUNE N/A [facility] [employer] [facility] [id number] Right 1 Implanted       SPECIMENS:  None    OPERATIVE FINDINGS: Advanced arthritis right knee    ANESTHESIA:   Spinal anesthetic, adductor canal block, and conscious sedation.    FLUIDS: Please see anesthesia record    ESTIMATED BLOOD LOSS: 200cc    TOURNIQUET TIME: Not elevated    INDICATIONS FOR PROCEDURE:   Ashanti Cedeno is a 74 y.o. female who presented to clinic with

## 2025-05-13 NOTE — PERIOP NOTE
TRANSFER - OUT REPORT:    Verbal report given to Markie Guillory  being transferred to [facility] for routine post-op       Report consisted of patient's Situation, Background, Assessment and   Recommendations(SBAR).     Information from the following report(s) Nurse Handoff Report was reviewed with the receiving nurse.           Lines:   Peripheral IV 05/13/25 Left;Posterior Hand (Active)   Site Assessment Clean, dry & intact 05/13/25 0910   Line Status Flushed;Infusing 05/13/25 0910   Line Care Cap changed;Connections checked and tightened 05/13/25 0910   Phlebitis Assessment No symptoms 05/13/25 0910   Infiltration Assessment 0 05/13/25 0910   Alcohol Cap Used Yes 05/13/25 0910   Dressing Status Clean, dry & intact 05/13/25 0910   Dressing Type Transparent 05/13/25 0910   Dressing Intervention New 05/13/25 0603        Opportunity for questions and clarification was provided.      Patient transported with:  Registered Nurse

## 2025-05-13 NOTE — BRIEF OP NOTE
Brief Postoperative Note      Patient: Ashanti Cedeno  YOB: 1950  MRN: [id number]    Date of Procedure: 5/13/2025    Pre-Op Diagnosis Codes:      * Primary osteoarthritis of right knee [M17.11]    Post-Op Diagnosis: Same       Procedure(s):  RIGHT TOTAL KNEE REPLACEMENT (SPINAL WITH REGIONAL BLOCK) (FAST TRACK)    Surgeon(s):  Eleuterio Tompkins MD    Assistant:  Surgical Assistant: Sincere Wells  Physician Assistant: Jorge Walters PA-C    Anesthesia: Spinal    Estimated Blood Loss (mL): 200     Complications: None    Specimens:   * No specimens in log *    Implants:  Implant Name Type Inv. Item Serial No.  Lot No. LRB No. Used Action   CEMENT BNE 40GM FULL DOSE PMMA W/ GENT HI VISC SIMPLEX P 10 - SNA  CEMENT BNE 40GM FULL DOSE PMMA W/ GENT HI VISC SIMPLEX P 10 NA [employer] [facility] [id number] Right 2 Implanted   BASEPLATE TIB SZ 2 FIX BEAR JUAN ATTUNE - SN/A  BASEPLATE TIB SZ 2 FIX BEAR JUAN ATTUNE N/A [employer] [facility] [id number] Right 1 Implanted   COMPONENT FEM SZ 3 R CRUCE RET JUAN ATTUNE - SN/A  COMPONENT FEM SZ 3 R CRUCE RET JUAN ATTUNE N/A [employer] [facility] [id number] Right 1 Implanted   COMPONENT PAT [id number] POLYETH DOME JUAN MEDIALIZED ATTUNE - SN/A  COMPONENT PAT [id number] POLYETH DOME JUAN MEDIALIZED ATTUNE N/A [employer] [facility] [id number] Right 1 Implanted   INSERT TIB FIX BEAR 3 5 MM RT MEDL KNEE STBL AOX ATTUNE - SN/A  INSERT TIB FIX BEAR 3 5 MM RT MEDL KNEE STBL AOX ATTUNE N/A [employer] [facility] [id number] Right 1 Implanted         Drains: * No LDAs found *    Findings:  Infection Present At Time Of Surgery (PATOS) (choose all levels that have infection present):  No infection present  Other Findings: advanced OA present    Electronically signed by Eleuterio Tompkins MD on 5/13/2025 at 11:30 AM

## 2025-05-13 NOTE — ANESTHESIA PROCEDURE NOTES
Spinal Block    Patient location during procedure: pre-op  End time: 5/13/2025 7:23 AM  Reason for block: post-op pain management, primary anesthetic and at surgeon's request  Staffing  Performed: resident/CRNA   Resident/CRNA: Raffi Pa APRN - CRNA  Performed by: Raffi Pa APRN - CRNA  Authorized by: Feroz Gordon MD    Spinal Block  Patient position: sitting  Prep: DuraPrep  Patient monitoring: cardiac monitor, continuous pulse ox, continuous capnometry, frequent blood pressure checks and oxygen  Approach: midline  Location: L3/L4  Provider prep: mask and sterile gloves  Needle  Needle type: Pencan   Needle gauge: 25 G  Needle length: 3.5 in  Assessment  Swirl obtained: Yes  CSF: clear  Attempts: 1  Hemodynamics: stable  Preanesthetic Checklist  Completed: patient identified, IV checked, site marked, risks and benefits discussed, surgical/procedural consents, pre-op evaluation, timeout performed, anesthesia consent given, oxygen available and monitors applied/VS acknowledged

## 2025-05-14 VITALS
WEIGHT: 186.95 LBS | RESPIRATION RATE: 17 BRPM | SYSTOLIC BLOOD PRESSURE: 120 MMHG | HEART RATE: 75 BPM | OXYGEN SATURATION: 96 % | TEMPERATURE: 97.7 F | DIASTOLIC BLOOD PRESSURE: 66 MMHG | HEIGHT: 64 IN | BODY MASS INDEX: 31.92 KG/M2

## 2025-05-14 LAB
ANION GAP SERPL CALC-SCNC: 5 MMOL/L (ref 2–12)
BUN SERPL-MCNC: 15 MG/DL (ref 6–20)
BUN/CREAT SERPL: 15 (ref 12–20)
CALCIUM SERPL-MCNC: 9.4 MG/DL (ref 8.5–10.1)
CHLORIDE SERPL-SCNC: 104 MMOL/L (ref 97–108)
CO2 SERPL-SCNC: 23 MMOL/L (ref 21–32)
CREAT SERPL-MCNC: 0.98 MG/DL (ref 0.55–1.02)
ERYTHROCYTE [DISTWIDTH] IN BLOOD BY AUTOMATED COUNT: 13 % (ref 11.5–14.5)
GLUCOSE SERPL-MCNC: 233 MG/DL (ref 65–100)
HCT VFR BLD AUTO: 36.9 % (ref 35–47)
HGB BLD-MCNC: 12.5 G/DL (ref 11.5–16)
MCH RBC QN AUTO: 30 PG (ref 26–34)
MCHC RBC AUTO-ENTMCNC: 33.9 G/DL (ref 30–36.5)
MCV RBC AUTO: 88.7 FL (ref 80–99)
NRBC # BLD: 0 K/UL (ref 0–0.01)
NRBC BLD-RTO: 0 PER 100 WBC
PLATELET # BLD AUTO: 367 K/UL (ref 150–400)
PMV BLD AUTO: 9.3 FL (ref 8.9–12.9)
POTASSIUM SERPL-SCNC: 4.8 MMOL/L (ref 3.5–5.1)
RBC # BLD AUTO: 4.16 M/UL (ref 3.8–5.2)
SODIUM SERPL-SCNC: 132 MMOL/L (ref 136–145)
WBC # BLD AUTO: 17.1 K/UL (ref 3.6–11)

## 2025-05-14 PROCEDURE — APPNB30 APP NON BILLABLE TIME 0-30 MINS: Performed by: NURSE PRACTITIONER

## 2025-05-14 PROCEDURE — 2500000003 HC RX 250 WO HCPCS: Performed by: STUDENT IN AN ORGANIZED HEALTH CARE EDUCATION/TRAINING PROGRAM

## 2025-05-14 PROCEDURE — 6370000000 HC RX 637 (ALT 250 FOR IP): Performed by: ORTHOPAEDIC SURGERY

## 2025-05-14 PROCEDURE — 97535 SELF CARE MNGMENT TRAINING: CPT

## 2025-05-14 PROCEDURE — 97165 OT EVAL LOW COMPLEX 30 MIN: CPT

## 2025-05-14 PROCEDURE — 96374 THER/PROPH/DIAG INJ IV PUSH: CPT

## 2025-05-14 PROCEDURE — 97116 GAIT TRAINING THERAPY: CPT

## 2025-05-14 PROCEDURE — 6360000002 HC RX W HCPCS: Performed by: STUDENT IN AN ORGANIZED HEALTH CARE EDUCATION/TRAINING PROGRAM

## 2025-05-14 PROCEDURE — 36415 COLL VENOUS BLD VENIPUNCTURE: CPT

## 2025-05-14 PROCEDURE — 94761 N-INVAS EAR/PLS OXIMETRY MLT: CPT

## 2025-05-14 PROCEDURE — G0378 HOSPITAL OBSERVATION PER HR: HCPCS

## 2025-05-14 PROCEDURE — 6370000000 HC RX 637 (ALT 250 FOR IP): Performed by: STUDENT IN AN ORGANIZED HEALTH CARE EDUCATION/TRAINING PROGRAM

## 2025-05-14 PROCEDURE — 80048 BASIC METABOLIC PNL TOTAL CA: CPT

## 2025-05-14 PROCEDURE — 97530 THERAPEUTIC ACTIVITIES: CPT

## 2025-05-14 PROCEDURE — 96376 TX/PRO/DX INJ SAME DRUG ADON: CPT

## 2025-05-14 PROCEDURE — 85027 COMPLETE CBC AUTOMATED: CPT

## 2025-05-14 RX ORDER — LEVOTHYROXINE SODIUM 100 UG/1
100 TABLET ORAL
Status: DISCONTINUED | OUTPATIENT
Start: 2025-05-14 | End: 2025-05-14 | Stop reason: HOSPADM

## 2025-05-14 RX ORDER — FENOFIBRATE 160 MG/1
160 TABLET ORAL
Status: DISCONTINUED | OUTPATIENT
Start: 2025-05-14 | End: 2025-05-14 | Stop reason: HOSPADM

## 2025-05-14 RX ADMIN — LEVOTHYROXINE SODIUM 100 MCG: 0.1 TABLET ORAL at 06:21

## 2025-05-14 RX ADMIN — FENOFIBRATE 160 MG: 160 TABLET ORAL at 06:21

## 2025-05-14 RX ADMIN — ONDANSETRON 4 MG: 2 INJECTION, SOLUTION INTRAMUSCULAR; INTRAVENOUS at 13:00

## 2025-05-14 RX ADMIN — ONDANSETRON 4 MG: 2 INJECTION, SOLUTION INTRAMUSCULAR; INTRAVENOUS at 00:42

## 2025-05-14 RX ADMIN — SODIUM CHLORIDE, PRESERVATIVE FREE 10 ML: 5 INJECTION INTRAVENOUS at 09:25

## 2025-05-14 RX ADMIN — POLYETHYLENE GLYCOL 3350 17 G: 17 POWDER, FOR SOLUTION ORAL at 09:21

## 2025-05-14 RX ADMIN — ASPIRIN 81 MG: 81 TABLET, COATED ORAL at 09:20

## 2025-05-14 RX ADMIN — ACETAMINOPHEN 1000 MG: 500 TABLET ORAL at 00:42

## 2025-05-14 RX ADMIN — OXYCODONE 5 MG: 5 TABLET ORAL at 09:20

## 2025-05-14 RX ADMIN — OXYCODONE 5 MG: 5 TABLET ORAL at 13:22

## 2025-05-14 RX ADMIN — WATER 2000 MG: 1 INJECTION INTRAMUSCULAR; INTRAVENOUS; SUBCUTANEOUS at 00:43

## 2025-05-14 RX ADMIN — ACETAMINOPHEN 1000 MG: 500 TABLET ORAL at 06:21

## 2025-05-14 ASSESSMENT — PAIN SCALES - GENERAL
PAINLEVEL_OUTOF10: 3
PAINLEVEL_OUTOF10: 4
PAINLEVEL_OUTOF10: 5

## 2025-05-14 ASSESSMENT — PAIN DESCRIPTION - DESCRIPTORS
DESCRIPTORS: ACHING
DESCRIPTORS: ACHING;THROBBING
DESCRIPTORS: ACHING;THROBBING

## 2025-05-14 ASSESSMENT — PAIN DESCRIPTION - ORIENTATION
ORIENTATION: LEFT
ORIENTATION: RIGHT
ORIENTATION: RIGHT

## 2025-05-14 ASSESSMENT — PAIN DESCRIPTION - LOCATION
LOCATION: KNEE

## 2025-05-14 NOTE — PLAN OF CARE
Problem: Chronic Conditions and Co-morbidities  Goal: Patient's chronic conditions and co-morbidity symptoms are monitored and maintained or improved  5/13/2025 2341 by Marlo Boone, RN  Outcome: Adequate for Discharge     Problem: Pain  Goal: Verbalizes/displays adequate comfort level or baseline comfort level  5/13/2025 2341 by Marlo Boone, RN  Outcome: Adequate for Discharge     Problem: Safety - Adult  Goal: Free from fall injury  5/13/2025 2341 by Marlo Boone, RN  Outcome: Adequate for Discharge     Problem: Safety - Adult  Goal: Free from fall injury  Outcome: Progressing       Problem: Discharge Planning  Goal: Discharge to home or other facility with appropriate resources  5/13/2025 2341 by Marlo Boone, RN  Outcome: Adequate for Discharge          
  Problem: Chronic Conditions and Co-morbidities  Goal: Patient's chronic conditions and co-morbidity symptoms are monitored and maintained or improved  Outcome: Progressing     Problem: Pain  Goal: Verbalizes/displays adequate comfort level or baseline comfort level  Outcome: Progressing     Problem: Safety - Adult  Goal: Free from fall injury  Outcome: Progressing     Problem: Physical Therapy - Adult  Goal: By Discharge: Performs mobility at highest level of function for planned discharge setting.  See evaluation for individualized goals.  Description: FUNCTIONAL STATUS PRIOR TO ADMISSION: Patient was independent and active without use of DME.    HOME SUPPORT PRIOR TO ADMISSION: The patient lived with her  but did not require assistance.    Physical Therapy Goals  Initiated 5/13/2025  1.  Patient will move from supine to sit and sit to supine, scoot up and down, and roll side to side in bed with modified independence within 7 day(s).    2.  Patient will perform sit to stand with modified independence within 7 day(s).  3.  Patient will transfer from bed to chair and chair to bed with modified independence using the least restrictive device within 7 day(s).  4.  Patient will ambulate with modified independence for 150 feet with the least restrictive device within 7 day(s).   5.  Patient will ascend/descend 4 stairs with 1 handrail(s) with supervision/set-up within 7 day(s).  6. Patient will perform LE home exercise program per protocol with modified independence within 7 days.  7. Patient will demonstrate AROM 0-90 degrees in operative joint within 7 days.        5/13/2025 1332 by Janeth Johnson, PT  Outcome: Progressing     Problem: Discharge Planning  Goal: Discharge to home or other facility with appropriate resources  Outcome: Progressing     
  Problem: Physical Therapy - Adult  Goal: By Discharge: Performs mobility at highest level of function for planned discharge setting.  See evaluation for individualized goals.  Description: FUNCTIONAL STATUS PRIOR TO ADMISSION: Patient was independent and active without use of DME.    HOME SUPPORT PRIOR TO ADMISSION: The patient lived with her  but did not require assistance.    Physical Therapy Goals  Initiated 5/13/2025  1.  Patient will move from supine to sit and sit to supine, scoot up and down, and roll side to side in bed with modified independence within 7 day(s).    2.  Patient will perform sit to stand with modified independence within 7 day(s).  3.  Patient will transfer from bed to chair and chair to bed with modified independence using the least restrictive device within 7 day(s).  4.  Patient will ambulate with modified independence for 150 feet with the least restrictive device within 7 day(s).   5.  Patient will ascend/descend 4 stairs with 1 handrail(s) with supervision/set-up within 7 day(s).  6. Patient will perform LE home exercise program per protocol with modified independence within 7 days.  7. Patient will demonstrate AROM 0-90 degrees in operative joint within 7 days.        Outcome: Progressing   PHYSICAL THERAPY EVALUATION    Patient: Ashanti Cedeno (74 y.o. female)  Date: 5/13/2025  Primary Diagnosis: Primary osteoarthritis of right knee [M17.11]  Total knee replacement status, unspecified laterality [Z96.659]  Procedure(s) (LRB):  RIGHT TOTAL KNEE REPLACEMENT (SPINAL WITH REGIONAL BLOCK) (FAST TRACK) (Right) * Day of Surgery *   Precautions: Restrictions/Precautions  Restrictions/Precautions: Weight Bearing Lower Extremity Weight Bearing Restrictions  Right Lower Extremity Weight Bearing: Weight Bearing As Tolerated          ASSESSMENT :   DEFICITS/IMPAIRMENTS:   Based on the objective data below, the patient presents with expected level of R knee pain, decreased AROM and 
was discussed with: physical therapist           Tung Boggs, PTA  Minutes: 23

## 2025-05-14 NOTE — PROGRESS NOTES
OCCUPATIONAL THERAPY EVALUATION/DISCHARGE  Patient: Ashanti Cedeno (74 y.o. female)  Date: 5/14/2025  Primary Diagnosis: Primary osteoarthritis of right knee [M17.11]  Total knee replacement status, unspecified laterality [Z96.659]  Procedure(s) (LRB):  RIGHT TOTAL KNEE REPLACEMENT (SPINAL WITH REGIONAL BLOCK) (FAST TRACK) (Right) 1 Day Post-Op     Precautions: Weight Bearing Right Lower Extremity Weight Bearing: Weight Bearing As Tolerated                ASSESSMENT :  The patient is a pleasant 74 y.o. female who is s/p R TKA POD #1. She currently is mobilizing with Mod I with RW use, walking into the bathroom twice and back to recliner. She completed toileting and grooming tasks with Mod I. Patient educated and trained on adaptive dressing techniques, patient demonstrating good understanding. She required Min A for distal reach with lower body dressing. Patient reports that her  is available to assist with tasks PRN at home. Patient has no further skilled OT needs at this time.     Functional Outcome Measure:  The patient scored 23/24 on the -PAC outcome measure which is indicative of lower likelihood of requiring post acute rehab.      Further skilled acute occupational therapy is not indicated at this time.     PLAN :  Recommend with staff: Recommend with nursing, ADLs with supervision, OOB to chair 3x/day via rolling walker and toileting via functional mobility to and from bathroom. Thank you for completing as able in order to maintain patient strength, endurance and independence.       Recommendation for discharge: (in order for the patient to meet his/her long term goals):   No skilled occupational therapy    Other factors to consider for discharge: no additional factors    IF patient discharges home will need the following DME: none     SUBJECTIVE:   Patient stated, “The pain is starting to wake up now.”    OBJECTIVE DATA SUMMARY:     Past Medical History:   Diagnosis Date    Chronic kidney disease,

## 2025-05-14 NOTE — PROGRESS NOTES
Nurse handed patient a copy of discharge instructions which have been read and explained to patient. New medications were read and explained to patient, patient verbalized understanding. Patient aware that prescriptions have been electronically sent to their pharmacy. Opportunity for questions and clarification offered. Removed patient's IV access with no complications. Vital signs stable. Patient gathered all belongings. Currently waiting on  for transport

## 2025-05-14 NOTE — PROGRESS NOTES
Orthopedic Rounding Note    Subjective:  Patient reports doing ok this morning. Pain is mild.    Objective:  Vitals:    05/14/25 0350   BP: 130/60   Pulse: 75   Resp:    Temp: 97.2 °F (36.2 °C)   SpO2: 96%       Recent Labs     05/14/25  0107   HGB 12.5   HCT 36.9   *   K 4.8      CO2 23   BUN 15         Exam:    NAD   Breathing well on room air  Adequate perfusion in distal extremities    RLE:  Bandage C/D/I  Swelling within expected limits  No evidence of DVT  Palpable pedal pulse distally  Sensation intact to light touch in S/S/DP/SP/T nerve distributions  Motor intact to EHL/FHL/TA/GSC       Assessment:  S/p R TKA    Plan:  ASA, SCDs for DVT prophylaxis  WBAT  PTOT  Continue in hospital care for now  Pain control  Anticipate discharge today

## 2025-06-19 ENCOUNTER — OFFICE VISIT (OUTPATIENT)
Age: 75
End: 2025-06-19
Payer: MEDICARE

## 2025-06-19 VITALS
OXYGEN SATURATION: 98 % | SYSTOLIC BLOOD PRESSURE: 123 MMHG | HEIGHT: 64 IN | HEART RATE: 78 BPM | BODY MASS INDEX: 31.41 KG/M2 | DIASTOLIC BLOOD PRESSURE: 69 MMHG | WEIGHT: 184 LBS | TEMPERATURE: 97.7 F | RESPIRATION RATE: 16 BRPM

## 2025-06-19 DIAGNOSIS — K75.81 NONALCOHOLIC STEATOHEPATITIS (NASH): Primary | ICD-10-CM

## 2025-06-19 PROCEDURE — 99214 OFFICE O/P EST MOD 30 MIN: CPT | Performed by: PHYSICIAN ASSISTANT

## 2025-06-19 PROCEDURE — 91200 LIVER ELASTOGRAPHY: CPT | Performed by: PHYSICIAN ASSISTANT

## 2025-06-19 RX ORDER — CYCLOBENZAPRINE HCL 10 MG
10 TABLET ORAL 3 TIMES DAILY PRN
COMMUNITY
Start: 2025-05-27

## 2025-06-19 RX ORDER — PREGABALIN 75 MG/1
75 CAPSULE ORAL 2 TIMES DAILY
COMMUNITY
Start: 2025-05-27 | End: 2025-06-26

## 2025-06-19 RX ORDER — FENOFIBRATE 160 MG/1
TABLET ORAL
COMMUNITY
Start: 2025-06-09

## 2025-06-19 ASSESSMENT — PATIENT HEALTH QUESTIONNAIRE - PHQ9
SUM OF ALL RESPONSES TO PHQ QUESTIONS 1-9: 0
2. FEELING DOWN, DEPRESSED OR HOPELESS: NOT AT ALL
1. LITTLE INTEREST OR PLEASURE IN DOING THINGS: NOT AT ALL
SUM OF ALL RESPONSES TO PHQ QUESTIONS 1-9: 0

## 2025-06-19 NOTE — PROGRESS NOTES
Identified pt with two pt identifiers(name and ). Reviewed record in preparation for visit and have obtained necessary documentation. All patient medications has been reviewed.  Chief Complaint   Patient presents with    Follow-up     Fibroscan             Wt Readings from Last 3 Encounters:   25 83.5 kg (184 lb)   25 84.8 kg (186 lb 15.2 oz)   25 83.6 kg (184 lb 3.2 oz)     Temp Readings from Last 3 Encounters:   25 97.7 °F (36.5 °C) (Temporal)   25 97.7 °F (36.5 °C) (Oral)   25 97.5 °F (36.4 °C) (Temporal)     BP Readings from Last 3 Encounters:   25 123/69   25 120/66   25 (!) 142/68     Pulse Readings from Last 3 Encounters:   25 78   25 75   25 73       Have you been to the ER, urgent care clinic since your last visit?  Hospitalized since your last visit?   NO    Have you seen or consulted any other health care providers outside our system since your last visit?   NO

## 2025-06-19 NOTE — PROGRESS NOTES
[facility]      Bakari Hardwick MD, FACP, FACG, FAASLD      MEHDI Salcido, [facility]   Lorenza Lin, [facility]-   Lida Hathaway, FNP-C  Fredi Grubbs, FNP-C   Valentine La, [facility]   at [facility]   [address], [address]   [place]  [address] [phone]   FAX: [phone]  [facility]   [address], [address]   [place]  [address] [phone]   FAX: [phone]     Patient Care Team:  Tami Grissom MD as PCP - General  Tami Grissom MD as PCP - Empaneled Provider    Patient Active Problem List   Diagnosis    KIM (nonalcoholic steatohepatitis)    Sleep apnea    Hypothyroidism    Diabetes mellitus, type 2 (HCC)    H/O total knee replacement    Hypertension    S/P DALLAS (total abdominal hysterectomy)    H/O carpal tunnel repair    Kidney stones    History of cholecystectomy    Examination of participant in clinical trial    Status post total right knee replacement     Ashanti Cedeno returns to the [facility] for management of metabolic-associated steatohepatitis (MASH). The active problem list, all pertinent past medical history, medications, radiologic findings and laboratory findings related to the liver disorder were reviewed with the patient.      The patient is a 75 y.o.  female who was found elevated liver enzymes in 2018 and MASH on liver biopsy in 10/2018.     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    MRI performed in 10/2018 suggested fatty liver disease.      Fibroscan in 8/2022 was 11.5 with  suggesting fatty liver and stage 3 fibrosis.  The patient underwent a liver biopsy in 10/2022.  This demonstrates mild KIM with stage 3 fibrosis.  Repeat Fibroscan in

## 2025-06-25 ENCOUNTER — PATIENT MESSAGE (OUTPATIENT)
Age: 75
End: 2025-06-25

## (undated) DEVICE — SUTURE VICRYL + SZ 1 L27IN ABSRB UD CT-1 L36MM 1/2 CIR TAPR VCPP40D

## (undated) DEVICE — 4-PORT MANIFOLD: Brand: NEPTUNE 2

## (undated) DEVICE — SUTURE ABSRB L30CM 2-0 VLT SPRL PDS + STRATAFIX SXPP1B410

## (undated) DEVICE — SOL IRR SOD CHL 0.9% TITAN XL CNTNR 3000ML

## (undated) DEVICE — ZIMMER® STERILE DISPOSABLE TOURNIQUET CUFF WITH PROTECTIVE SLEEVE AND PLC, DUAL PORT, SINGLE BLADDER, 34 IN. (86 CM)

## (undated) DEVICE — GLOVE ORTHO 7 1/2   MSG9475

## (undated) DEVICE — FOAM BUMP, LARGE: Brand: MEDLINE INDUSTRIES, INC.

## (undated) DEVICE — SUTURE ABSORBABLE MONOFILAMENT 1 CTX 36 CM 48 MM VIO PDS +

## (undated) DEVICE — ELECTRODE BLDE L4IN NONINSULATED EDGE

## (undated) DEVICE — DRAPE SURG KNEE 1 MIN SET ESYSUIT

## (undated) DEVICE — YANKAUER,OPEN TIP,W/O VENT,STERILE: Brand: MEDLINE INDUSTRIES, INC.

## (undated) DEVICE — TOTAL JOINT-SFMC: Brand: MEDLINE INDUSTRIES, INC.

## (undated) DEVICE — GLOVE SURG SZ 8 L12IN FNGR THK79MIL GRN LTX FREE

## (undated) DEVICE — MAX-CORE® DISPOSABLE CORE BIOPSY INSTRUMENT, 16G X 16CM: Brand: MAX-CORE

## (undated) DEVICE — SOLUTION SCRB 1% POVIDONE IOD BRN ANTIMIC SKIN CLN

## (undated) DEVICE — SPONGE GZ W4XL4IN COT 12 PLY TYP VII WVN C FLD DSGN STERILE

## (undated) DEVICE — STRYKER PERFORMANCE SERIES SAGITTAL BLADE: Brand: STRYKER PERFORMANCE SERIES

## (undated) DEVICE — SOLUTION WND IRRIGATION 450 ML 0.5 PVP-I 0.9 NACL

## (undated) DEVICE — DRESSING ANTIMIC FOAM MEPILEX BORD POSTOP AG PROD SZ 4X12 IN

## (undated) DEVICE — Device: Brand: JELCO

## (undated) DEVICE — DRAPE,REIN 53X77,STERILE: Brand: MEDLINE

## (undated) DEVICE — HOOD SURG T7

## (undated) DEVICE — SOLUTION IRRIG 1000ML H2O PIC PLAS SHATTERPROOF CONTAINER

## (undated) DEVICE — TRAY BX SFT TISS W/ RUL ALC PREP PD FEN DRP TWL LUERLOCK

## (undated) DEVICE — SUTURE VICRYL + SZ 2-0 L18IN ABSRB UD CT1 L36MM 1/2 CIR VCP839D

## (undated) DEVICE — PENCIL SMK EVAC ALL IN 1 DSGN ENH VISIBILITY IMPROVED AIR

## (undated) DEVICE — SYRINGE MED 30ML STD CLR PLAS LUERLOCK TIP N CTRL DISP

## (undated) DEVICE — SUTURE MONOCRYL STRATAFIX SPRL + 3 0 SGL ARMED PS 1 24 IN LEN SXMP1B103

## (undated) DEVICE — ATTUNE SOLO PINNING SYSTEM

## (undated) DEVICE — GLOVE ORTHO 8   MSG9480

## (undated) DEVICE — CEMENT MIXING SYSTEM WITH FEMORAL BREAKWAY NOZZLE: Brand: REVOLUTION